# Patient Record
Sex: MALE | Race: WHITE | Employment: FULL TIME | URBAN - METROPOLITAN AREA
[De-identification: names, ages, dates, MRNs, and addresses within clinical notes are randomized per-mention and may not be internally consistent; named-entity substitution may affect disease eponyms.]

---

## 2017-03-09 ENCOUNTER — TELEPHONE (OUTPATIENT)
Dept: INTERNAL MEDICINE CLINIC | Age: 57
End: 2017-03-09

## 2017-03-09 NOTE — TELEPHONE ENCOUNTER
Pt called and wants to have labs done at [facility], [facility] general please call pt states he will  order on Monday.

## 2017-03-16 ENCOUNTER — OFFICE VISIT (OUTPATIENT)
Dept: INTERNAL MEDICINE CLINIC | Age: 57
End: 2017-03-16

## 2017-03-16 ENCOUNTER — HOSPITAL ENCOUNTER (OUTPATIENT)
Dept: LAB | Age: 57
Discharge: HOME | End: 2017-03-16
Payer: COMMERCIAL

## 2017-03-16 VITALS
HEART RATE: 51 BPM | TEMPERATURE: 97.4 F | RESPIRATION RATE: 18 BRPM | SYSTOLIC BLOOD PRESSURE: 144 MMHG | DIASTOLIC BLOOD PRESSURE: 70 MMHG | WEIGHT: 246 LBS | BODY MASS INDEX: 39.53 KG/M2 | OXYGEN SATURATION: 97 % | HEIGHT: 66 IN

## 2017-03-16 DIAGNOSIS — I10 UNSPECIFIED ESSENTIAL HYPERTENSION: ICD-10-CM

## 2017-03-16 DIAGNOSIS — R80.9 TYPE 2 DIABETES MELLITUS WITH MICROALBUMINURIA, WITH LONG-TERM CURRENT USE OF INSULIN (HCC): ICD-10-CM

## 2017-03-16 DIAGNOSIS — E78.5 HYPERLIPIDEMIA, UNSPECIFIED HYPERLIPIDEMIA TYPE: ICD-10-CM

## 2017-03-16 DIAGNOSIS — Z79.4 TYPE 2 DIABETES MELLITUS WITH MICROALBUMINURIA, WITH LONG-TERM CURRENT USE OF INSULIN (HCC): ICD-10-CM

## 2017-03-16 DIAGNOSIS — Z79.4 TYPE 2 DIABETES MELLITUS WITH MICROALBUMINURIA, WITH LONG-TERM CURRENT USE OF INSULIN (HCC): Primary | ICD-10-CM

## 2017-03-16 DIAGNOSIS — E11.29 TYPE 2 DIABETES MELLITUS WITH MICROALBUMINURIA, WITH LONG-TERM CURRENT USE OF INSULIN (HCC): ICD-10-CM

## 2017-03-16 DIAGNOSIS — L01.00 IMPETIGO: ICD-10-CM

## 2017-03-16 DIAGNOSIS — E03.9 HYPOTHYROIDISM, UNSPECIFIED TYPE: ICD-10-CM

## 2017-03-16 DIAGNOSIS — E11.29 TYPE 2 DIABETES MELLITUS WITH MICROALBUMINURIA, WITH LONG-TERM CURRENT USE OF INSULIN (HCC): Primary | ICD-10-CM

## 2017-03-16 DIAGNOSIS — R80.9 TYPE 2 DIABETES MELLITUS WITH MICROALBUMINURIA, WITH LONG-TERM CURRENT USE OF INSULIN (HCC): Primary | ICD-10-CM

## 2017-03-16 LAB
ALBUMIN SERPL BCP-MCNC: 3.6 G/DL (ref 3.4–5)
ALBUMIN/GLOB SERPL: 0.9 {RATIO} (ref 0.8–1.7)
ALP SERPL-CCNC: 80 U/L (ref 45–117)
ALT SERPL-CCNC: 45 U/L (ref 16–61)
ANION GAP BLD CALC-SCNC: 10 MMOL/L (ref 3–18)
AST SERPL W P-5'-P-CCNC: 30 U/L (ref 15–37)
BASOPHILS # BLD AUTO: 0.1 K/UL (ref 0–0.06)
BASOPHILS # BLD: 1 % (ref 0–2)
BILIRUB SERPL-MCNC: 0.7 MG/DL (ref 0.2–1)
BILIRUB UR QL STRIP: NEGATIVE
BUN SERPL-MCNC: 11 MG/DL (ref 7–18)
BUN/CREAT SERPL: 13 (ref 12–20)
CALCIUM SERPL-MCNC: 9 MG/DL (ref 8.5–10.1)
CHLORIDE SERPL-SCNC: 107 MMOL/L (ref 100–108)
CHOLEST SERPL-MCNC: 209 MG/DL
CO2 SERPL-SCNC: 25 MMOL/L (ref 21–32)
CREAT SERPL-MCNC: 0.84 MG/DL (ref 0.6–1.3)
CREAT SERPL-MCNC: 200 MG/DL
DIFFERENTIAL METHOD BLD: ABNORMAL
EOSINOPHIL # BLD: 0.2 K/UL (ref 0–0.4)
EOSINOPHIL NFR BLD: 2 % (ref 0–5)
ERYTHROCYTE [DISTWIDTH] IN BLOOD BY AUTOMATED COUNT: 13.1 % (ref 11.6–14.5)
EST. AVERAGE GLUCOSE BLD GHB EST-MCNC: 186 MG/DL
GLOBULIN SER CALC-MCNC: 4.1 G/DL (ref 2–4)
GLUCOSE SERPL-MCNC: 127 MG/DL (ref 74–99)
GLUCOSE UR-MCNC: NEGATIVE MG/DL
HBA1C MFR BLD: 8.1 % (ref 4.2–5.6)
HCT VFR BLD AUTO: 45.6 % (ref 36–48)
HDLC SERPL-MCNC: 47 MG/DL (ref 40–60)
HDLC SERPL: 4.4 {RATIO} (ref 0–5)
HGB BLD-MCNC: 15.6 G/DL (ref 13–16)
KETONES P FAST UR STRIP-MCNC: NEGATIVE MG/DL
LDLC SERPL CALC-MCNC: 129.2 MG/DL (ref 0–100)
LIPID PROFILE,FLP: ABNORMAL
LYMPHOCYTES # BLD AUTO: 18 % (ref 21–52)
LYMPHOCYTES # BLD: 1.5 K/UL (ref 0.9–3.6)
MCH RBC QN AUTO: 31 PG (ref 24–34)
MCHC RBC AUTO-ENTMCNC: 34.2 G/DL (ref 31–37)
MCV RBC AUTO: 90.5 FL (ref 74–97)
MICROALBUMIN UR TEST STR-MCNC: 150 MG/L
MICROALBUMIN/CREAT RATIO POC: NORMAL MG/G
MONOCYTES # BLD: 0.5 K/UL (ref 0.05–1.2)
MONOCYTES NFR BLD AUTO: 6 % (ref 3–10)
NEUTS SEG # BLD: 6.1 K/UL (ref 1.8–8)
NEUTS SEG NFR BLD AUTO: 73 % (ref 40–73)
PH UR STRIP: 5.5 [PH] (ref 4.6–8)
PLATELET # BLD AUTO: 215 K/UL (ref 135–420)
PMV BLD AUTO: 10.6 FL (ref 9.2–11.8)
POTASSIUM SERPL-SCNC: 3.7 MMOL/L (ref 3.5–5.5)
PROT SERPL-MCNC: 7.7 G/DL (ref 6.4–8.2)
PROT UR QL STRIP: NORMAL MG/DL
RBC # BLD AUTO: 5.04 M/UL (ref 4.7–5.5)
SODIUM SERPL-SCNC: 142 MMOL/L (ref 136–145)
SP GR UR STRIP: 1.03 (ref 1–1.03)
TRIGL SERPL-MCNC: 164 MG/DL (ref ?–150)
TSH SERPL DL<=0.05 MIU/L-ACNC: 1.97 UIU/ML (ref 0.36–3.74)
UA UROBILINOGEN AMB POC: NORMAL (ref 0.2–1)
URINALYSIS CLARITY POC: CLEAR
URINALYSIS COLOR POC: YELLOW
URINE BLOOD POC: NEGATIVE
URINE LEUKOCYTES POC: NEGATIVE
URINE NITRITES POC: NEGATIVE
VLDLC SERPL CALC-MCNC: 32.8 MG/DL
WBC # BLD AUTO: 8.3 K/UL (ref 4.6–13.2)

## 2017-03-16 PROCEDURE — 80053 COMPREHEN METABOLIC PANEL: CPT | Performed by: FAMILY MEDICINE

## 2017-03-16 PROCEDURE — 80061 LIPID PANEL: CPT | Performed by: FAMILY MEDICINE

## 2017-03-16 PROCEDURE — 83036 HEMOGLOBIN GLYCOSYLATED A1C: CPT | Performed by: FAMILY MEDICINE

## 2017-03-16 PROCEDURE — 84443 ASSAY THYROID STIM HORMONE: CPT | Performed by: FAMILY MEDICINE

## 2017-03-16 PROCEDURE — 85025 COMPLETE CBC W/AUTO DIFF WBC: CPT | Performed by: FAMILY MEDICINE

## 2017-03-16 RX ORDER — POTASSIUM CHLORIDE 750 MG/1
10 TABLET, EXTENDED RELEASE ORAL 2 TIMES DAILY
Qty: 60 TAB | Refills: 3 | Status: SHIPPED | OUTPATIENT
Start: 2017-03-16 | End: 2017-12-26 | Stop reason: SDUPTHER

## 2017-03-16 RX ORDER — INSULIN ASPART 100 [IU]/ML
INJECTION, SOLUTION INTRAVENOUS; SUBCUTANEOUS
Qty: 2 PEN | Refills: 3 | Status: SHIPPED | OUTPATIENT
Start: 2017-03-16 | End: 2017-03-16 | Stop reason: SDUPTHER

## 2017-03-16 RX ORDER — AMLODIPINE AND BENAZEPRIL HYDROCHLORIDE 5; 20 MG/1; MG/1
1 CAPSULE ORAL 2 TIMES DAILY
Qty: 60 CAP | Refills: 3 | Status: SHIPPED | OUTPATIENT
Start: 2017-03-16 | End: 2017-10-27 | Stop reason: SDUPTHER

## 2017-03-16 RX ORDER — MUPIROCIN CALCIUM 20 MG/G
CREAM TOPICAL 2 TIMES DAILY
Qty: 15 G | Refills: 0 | Status: SHIPPED | OUTPATIENT
Start: 2017-03-16

## 2017-03-16 RX ORDER — HYDROCHLOROTHIAZIDE 25 MG/1
25 TABLET ORAL DAILY
Qty: 30 TAB | Refills: 3 | Status: SHIPPED | OUTPATIENT
Start: 2017-03-16 | End: 2017-10-27 | Stop reason: SDUPTHER

## 2017-03-16 RX ORDER — ATORVASTATIN CALCIUM 40 MG/1
40 TABLET, FILM COATED ORAL DAILY
Qty: 30 TAB | Refills: 3 | Status: SHIPPED | OUTPATIENT
Start: 2017-03-16 | End: 2017-12-26 | Stop reason: SDUPTHER

## 2017-03-16 RX ORDER — LEVOTHYROXINE SODIUM 112 UG/1
112 TABLET ORAL
Qty: 30 TAB | Refills: 3 | Status: SHIPPED | OUTPATIENT
Start: 2017-03-16 | End: 2017-10-27 | Stop reason: SDUPTHER

## 2017-03-16 RX ORDER — INSULIN GLARGINE 100 [IU]/ML
86 INJECTION, SOLUTION SUBCUTANEOUS
Qty: 3 EACH | Refills: 3 | Status: SHIPPED | OUTPATIENT
Start: 2017-03-16 | End: 2017-12-26 | Stop reason: SDUPTHER

## 2017-03-16 RX ORDER — METFORMIN HYDROCHLORIDE 750 MG/1
750 TABLET, EXTENDED RELEASE ORAL 2 TIMES DAILY
Qty: 60 TAB | Refills: 3 | Status: SHIPPED | OUTPATIENT
Start: 2017-03-16 | End: 2017-12-26 | Stop reason: SDUPTHER

## 2017-03-16 NOTE — PATIENT INSTRUCTIONS
Impetigo: Care Instructions  Your Care Instructions  Impetigo (say \"xp-pkm-FZ-go\") is a skin infection caused by bacteria. It causes blisters that break and become oozing, yellow, crusty sores. Impetigo can be anywhere on the body. Scratching the sores may spread the infection to other parts of the body. You can also spread it to others through close contact or when you share towels, clothing, and other items. Prescription antibiotic ointment or pills can usually cure impetigo. (After a day of antibiotics, the infection should not spread.)  Follow-up care is a key part of your treatment and safety. Be sure to make and go to all appointments, and call your doctor if you are having problems. It's also a good idea to know your test results and keep a list of the medicines you take. How can you care for yourself at home? · Apply antibiotic ointment exactly as instructed. · If your doctor prescribed antibiotic pills, take them as directed. Do not stop using them just because you feel better. You need to take the full course of antibiotics. · Gently wash the sores with soap and water each day. If crusts form, your doctor may advise you to soften or remove the crusts. You can do this by soaking them in warm water and patting them dry. This can help the cream or ointment treat impetigo. · After you touch the area, wash your hands with soap and water. Or you can use an alcohol-based hand . · Don't share items such as towels, sheets, and clothing until the infection is gone. · Wash anything that may have touched the infected area. · Try to avoid scratching the area. When should you call for help? Call your doctor now or seek immediate medical care if:  · You have symptoms of a worse infection, such as:  ¨ Increased pain, swelling, warmth, or redness. ¨ Red streaks leading from the area. ¨ Pus draining from the area. ¨ A fever. · Impetigo gets worse or spreads to other areas.   Watch closely for changes in your health, and be sure to contact your doctor if:  · You do not get better as expected. Where can you learn more? Go to http://clair-margaret.info/. Enter B086 in the search box to learn more about \"Impetigo: Care Instructions. \"  Current as of: July 26, 2016  Content Version: 11.1  © 5860-0215 [employer]. Care instructions adapted under license by [employer] (which disclaims liability or warranty for this information). If you have questions about a medical condition or this instruction, always ask your healthcare professional. [place] [address] any warranty or liability for your use of this information.

## 2017-03-16 NOTE — PROGRESS NOTES
Subjective:   Patient is a 60y.o. year old male who presents for Hypertension (6 month follow up) and Diabetes  1. DM:  He's not doing well with his diabetes. For a while, he didn't have money to buy any insulin and was stretching out the insulin. He got more insulin last month, and he's taking 68 units daily to avoid hyperglycemia. When he eats, he has to take 20-30 units per meal of Novalog. His sugars are variable, sometimes getting into the 200s and sometimes in the 300s. He's trying to avoid fast food and keeping his carbs low. He's on metformin but usually only taking 1 at night. 2.  HTN:  He's on Lotrel and HCTZ. He takes this in the morning and took them this morning. 3.  Hyperlipidemia:  He stopped taking the Lipitor for a while because out of money but taking again. 4.  Hypothyroidism: On Synthroid daily 112 mcg. 3.  Grief reaction:  He's still grieving over the death of his dad who  when getting a CT scan last . He feels like he should have stopped them from doing the CT. Review of Systems   Constitutional: Negative. HENT:        Fluid in ear   Respiratory: Negative. Cardiovascular: Negative. Endo/Heme/Allergies: Negative. Current Outpatient Prescriptions on File Prior to Visit   Medication Sig Dispense Refill    glucose blood VI test strips (BLOOD GLUCOSE TEST) strip Use to check blood sugars as needed 200 Strip 3    BD INSULIN PEN NEEDLE UF MINI 31 gauge x 3/16\" ndle USE AS DIRECTED 100 Pen Needle 2    Lancets misc Use to check sugars 2 Package 11    [DISCONTINUED] potassium chloride (K-DUR, KLOR-CON) 10 mEq tablet Take 1 Tab by mouth two (2) times a day. 60 Tab 3     No current facility-administered medications on file prior to visit. Reviewed PmHx, RxHx, FmHx, SocHx, AllgHx and updated and dated in the chart.     Nurse notes were reviewed and are correct    Objective:     Vitals:    17 0834 17 0934   BP: 157/79 144/70   Pulse: (!) 51    Resp: 18    Temp: 97.4 °F (36.3 °C)    TempSrc: Oral    SpO2: 97%    Weight: 246 lb (111.6 kg)    Height: 5' 6\" (1.676 m)      Physical Exam   Constitutional: He is oriented to person, place, and time. He appears well-developed and well-nourished. No distress. HENT:   Head: Normocephalic and atraumatic. Nose: Nose normal.   Mouth/Throat: Oropharynx is clear and moist.   Bilateral cerumen, can't visualize TMs  Face:  1 cm diameter slightly crusted round sore on left chin   Eyes: EOM are normal. Pupils are equal, round, and reactive to light. Neck: Normal range of motion. Neck supple. Carotid bruit is not present. No tracheal deviation present. Cardiovascular: Normal rate, regular rhythm, normal heart sounds and intact distal pulses. Exam reveals no gallop and no friction rub. No murmur heard. Pulmonary/Chest: Effort normal and breath sounds normal. He has no wheezes. He has no rales. Abdominal: Soft. Bowel sounds are normal. He exhibits no distension. There is no tenderness. Musculoskeletal: He exhibits no edema or tenderness. Lymphadenopathy:     He has no cervical adenopathy. Neurological: He is alert and oriented to person, place, and time. Skin: Skin is warm and dry. Psychiatric: He has a normal mood and affect. His behavior is normal.   Nursing note and vitals reviewed. Urine:  2+ protein  Microalb/creat:      Assessment/ Plan:     Rodrigo was seen today for hypertension and diabetes. Diagnoses and all orders for this visit:    Type 2 diabetes mellitus with microalbuminuria, with long-term current use of insulin ([address]):  Getting A1c. Anticipate it will be high because he had to cut back on his insulin due to cost last month. He's interested in insulin pump, so sending to endocrinology.   -     AMB POC URINALYSIS DIP STICK AUTO W/O MICRO  -     AMB POC URINE, MICROALBUMIN, SEMIQUANTITATIVE  -     WY COLLECTION VENOUS BLOOD,VENIPUNCTURE  -     METABOLIC PANEL, COMPREHENSIVE; Future  -     HEMOGLOBIN A1C WITH EAG; Future  -     CBC WITH AUTOMATED DIFF; Future  -     insulin glargine (LANTUS SOLOSTAR) 100 unit/mL (3 mL) pen; 86 Units by SubCUTAneous route nightly. Indications: type 2 diabetes mellitus  -     insulin aspart (NOVOLOG FLEXPEN) 100 unit/mL inpn; AS PER SLIDING SCALE BEFORE MEALS  -     metFORMIN ER (GLUCOPHAGE XR) 750 mg tablet; Take 1 Tab by mouth two (2) times a day. -     REFERRAL TO ENDOCRINOLOGY    Hyperlipidemia, unspecified hyperlipidemia type:  He had stopped his Lipitor but is back on it. -     CO COLLECTION VENOUS BLOOD,VENIPUNCTURE  -     METABOLIC PANEL, COMPREHENSIVE; Future  -     LIPID PANEL; Future  -     CBC WITH AUTOMATED DIFF; Future  -     atorvastatin (LIPITOR) 40 mg tablet; Take 1 Tab by mouth daily. Hypothyroidism, unspecified type  -     CO COLLECTION VENOUS BLOOD,VENIPUNCTURE  -     METABOLIC PANEL, COMPREHENSIVE; Future  -     TSH 3RD GENERATION; Future  -     levothyroxine (SYNTHROID) 112 mcg tablet; Take 1 Tab by mouth Daily (before breakfast). Unspecified essential hypertension:  BP up slightly, but will not change medication since so close to 140. Recheck next time. Continue triple tx.  -     CO COLLECTION VENOUS BLOOD,VENIPUNCTURE  -     METABOLIC PANEL, COMPREHENSIVE; Future  -     potassium chloride (K-DUR, KLOR-CON) 10 mEq tablet; Take 1 Tab by mouth two (2) times a day. -     amLODIPine-benazepril (LOTREL) 5-20 mg per capsule; Take 1 Cap by mouth two (2) times a day. -     hydroCHLOROthiazide (HYDRODIURIL) 25 mg tablet; Take 1 Tab by mouth daily. Impetigo:  Presumptive. If the bactroban doesn't cure it, I told him I can send him to dermatology  -     mupirocin calcium (BACTROBAN) 2 % topical cream; Apply  to affected area two (2) times a day. I have discussed the diagnosis with the patient and the intended plan as seen in the above orders.   The patient verbalized understanding and agrees with the plan.    Follow-up Disposition:  Return in about 3 months (around 6/16/2017) for combo clinic.     Earlene Brock MD

## 2017-03-16 NOTE — MR AVS SNAPSHOT
Visit Information Date & Time Provider Department Dept. Phone Encounter #  
 3/16/2017  8:30 AM Elsy Gonzalez MD [facility] [id number] Follow-up Instructions Return in about 3 months (around 6/16/2017) for combo clinic. Upcoming Health Maintenance Date Due Pneumococcal 19-64 Medium Risk (1 of 1 - PPSV23) 3/20/1977 FOBT Q 1 YEAR AGE 50-75 3/20/2008 EYE EXAM RETINAL OR DILATED Q1 3/31/2016 FOOT EXAM Q1 4/17/2017 HEMOGLOBIN A1C Q6M 9/16/2017 LIPID PANEL Q1 9/22/2017 MICROALBUMIN Q1 3/16/2018 DTaP/Tdap/Td series (2 - Td) 9/22/2026 Allergies as of 3/16/2017  Review Complete On: 3/16/2017 By: Elsy Gonzalez MD  
  
 Severity Noted Reaction Type Reactions Bactrim [Sulfamethoprim Ds]  09/22/2016    Rash Penicillin G  07/01/2010    Rash Shellfish Containing Products  02/07/2012    Swelling Current Immunizations  Never Reviewed No immunizations on file. Not reviewed this visit You Were Diagnosed With   
  
 Codes Comments Type 2 diabetes mellitus with microalbuminuria, with long-term current use of insulin (HCC)    -  Primary ICD-10-CM: E11.29, R80.9, Z79.4 ICD-9-CM: 250.40, 791.0, V58.67 Hyperlipidemia, unspecified hyperlipidemia type     ICD-10-CM: E78.5 ICD-9-CM: 272.4 Hypothyroidism, unspecified type     ICD-10-CM: E03.9 ICD-9-CM: 244.9 Unspecified essential hypertension     ICD-10-CM: I10 
ICD-9-CM: 401.9 Essential hypertension with goal blood pressure less than 140/90     ICD-10-CM: I10 
ICD-9-CM: 401.9 Mixed hyperlipidemia     ICD-10-CM: E78.2 ICD-9-CM: 272.2 Acquired hypothyroidism     ICD-10-CM: E03.9 ICD-9-CM: 244.9 Uncontrolled type 2 diabetes mellitus with microalbuminuria, with long-term current use of insulin (HCC)     ICD-10-CM: E11.29, E11.65, R80.9, Z79.4 ICD-9-CM: 250.42, 791.0, V58.67 Impetigo     ICD-10-CM: L01.00 ICD-9-CM: 014 Vitals BP Pulse Temp Resp Height(growth percentile) Weight(growth percentile) 157/79 (BP 1 Location: Right arm, BP Patient Position: Sitting) (!) 51 97.4 °F (36.3 °C) (Oral) 18 5' 6\" (1.676 m) 246 lb (111.6 kg) SpO2 BMI Smoking Status 97% 39.71 kg/m2 Never Smoker Vitals History BMI and BSA Data Body Mass Index Body Surface Area  
 39.71 kg/m 2 2.28 m 2 Preferred Pharmacy Pharmacy Name Phone [facility] [address] [place] [address] ([facility]) [phone] Your Updated Medication List  
  
   
This list is accurate as of: 3/16/17  9:23 AM.  Always use your most recent med list. amLODIPine-benazepril 5-20 mg per capsule Commonly known as:  Mikle Chase Take 1 Cap by mouth two (2) times a day. atorvastatin 40 mg tablet Commonly known as:  LIPITOR Take 1 Tab by mouth daily. BD INSULIN PEN NEEDLE UF MINI 31 gauge x 3/16\" Ndle Generic drug:  Insulin Needles (Disposable) USE AS DIRECTED  
  
 glucose blood VI test strips strip Commonly known as:  blood glucose test  
Use to check blood sugars as needed  
  
 hydroCHLOROthiazide 25 mg tablet Commonly known as:  HYDRODIURIL Take 1 Tab by mouth daily. insulin aspart 100 unit/mL Inpn Commonly known as:  Nabil Sharma AS PER SLIDING SCALE BEFORE MEALS  
  
 insulin glargine 100 unit/mL (3 mL) pen Commonly known as:  LANTUS SOLOSTAR  
86 Units by SubCUTAneous route nightly. Indications: type 2 diabetes mellitus Lancets Misc Use to check sugars  
  
 levothyroxine 112 mcg tablet Commonly known as:  SYNTHROID Take 1 Tab by mouth Daily (before breakfast). metFORMIN  mg tablet Commonly known as:  GLUCOPHAGE XR Take 1 Tab by mouth two (2) times a day. mupirocin calcium 2 % topical cream  
Commonly known as:  [facility] Apply  to affected area two (2) times a day. potassium chloride 10 mEq tablet Commonly known as:  K-DUR, KLOR-CON Take 1 Tab by mouth two (2) times a day. Prescriptions Sent to Pharmacy Refills  
 potassium chloride (K-DUR, KLOR-CON) 10 mEq tablet 3 Sig: Take 1 Tab by mouth two (2) times a day. Class: Normal  
 Pharmacy: [facility] [address] Ph #: [phone] Route: Oral  
 insulin glargine (LANTUS SOLOSTAR) 100 unit/mL (3 mL) pen 3 Si Units by SubCUTAneous route nightly. Indications: type 2 diabetes mellitus Class: Normal  
 Pharmacy: [facility] [address] Ph #: [phone] Route: SubCUTAneous  
 insulin aspart (NOVOLOG FLEXPEN) 100 unit/mL inpn 3 Sig: AS PER SLIDING SCALE BEFORE MEALS Class: Normal  
 Pharmacy: [facility] [address] Ph #: [phone]  
 amLODIPine-benazepril (LOTREL) 5-20 mg per capsule 3 Sig: Take 1 Cap by mouth two (2) times a day. Class: Normal  
 Pharmacy: [facility] [address] Ph #: [phone] Route: Oral  
 atorvastatin (LIPITOR) 40 mg tablet 3 Sig: Take 1 Tab by mouth daily. Class: Normal  
 Pharmacy: [facility] [address] Ph #: [phone] Route: Oral  
 hydroCHLOROthiazide (HYDRODIURIL) 25 mg tablet 3 Sig: Take 1 Tab by mouth daily. Class: Normal  
 Pharmacy: [facility] [address] Ph #: [phone] Route: Oral  
 levothyroxine (SYNTHROID) 112 mcg tablet 3 Sig: Take 1 Tab by mouth Daily (before breakfast).   
 Class: Normal  
 Pharmacy: [facility] [address] [place] [address] ([facility]) Ph #: [phone] Route: Oral  
 metFORMIN ER (GLUCOPHAGE XR) 750 mg tablet 3 Sig: Take 1 Tab by mouth two (2) times a day. Class: Normal  
 Pharmacy: [facility] [address] Ph #: [phone] Route: Oral  
 mupirocin calcium (BACTROBAN) 2 % topical cream 0 Sig: Apply  to affected area two (2) times a day. Class: Normal  
 Pharmacy: [facility] [address] Ph #: [phone] Route: Topical  
  
We Performed the Following AMB POC URINALYSIS DIP STICK AUTO W/O MICRO [52932 CPT(R)] AMB POC URINE, MICROALBUMIN, SEMIQUANTITATIVE [46472 CPT(R)] ND COLLECTION VENOUS BLOOD,VENIPUNCTURE [id number] CPT(R)] REFERRAL TO ENDOCRINOLOGY [JEN63 Custom] Comments:  
 Please evaluate patient for insulin requiring type 2 DM with microalbuminuria. On high doses of insulin. Wants to consider insulin pump. Follow-up Instructions Return in about 3 months (around 6/16/2017) for combo clinic. To-Do List   
 03/16/2017 Lab:  CBC WITH AUTOMATED DIFF   
  
 03/16/2017 Lab:  HEMOGLOBIN A1C WITH EAG   
  
 03/16/2017 Lab:  LIPID PANEL   
  
 03/16/2017 Lab:  METABOLIC PANEL, COMPREHENSIVE   
  
 03/16/2017 Lab:  TSH 3RD GENERATION Referral Information Referral ID Referred By Referred To  
  
 [id number] Amol Cerrato Not Available Visits Status Start Date End Date 1 New Request 3/16/17 3/16/18 If your referral has a status of pending review or denied, additional information will be sent to support the outcome of this decision. Patient Instructions Impetigo: Care Instructions Your Care Instructions Impetigo (say \"gs-jiv-FF-go\") is a skin infection caused by bacteria. It causes blisters that break and become oozing, yellow, crusty sores. Impetigo can be anywhere on the body. Scratching the sores may spread the infection to other parts of the body. You can also spread it to others through close contact or when you share towels, clothing, and other items. Prescription antibiotic ointment or pills can usually cure impetigo. (After a day of antibiotics, the infection should not spread.) Follow-up care is a key part of your treatment and safety. Be sure to make and go to all appointments, and call your doctor if you are having problems. It's also a good idea to know your test results and keep a list of the medicines you take. How can you care for yourself at home? · Apply antibiotic ointment exactly as instructed. · If your doctor prescribed antibiotic pills, take them as directed. Do not stop using them just because you feel better. You need to take the full course of antibiotics. · Gently wash the sores with soap and water each day. If crusts form, your doctor may advise you to soften or remove the crusts. You can do this by soaking them in warm water and patting them dry. This can help the cream or ointment treat impetigo. · After you touch the area, wash your hands with soap and water. Or you can use an alcohol-based hand . · Don't share items such as towels, sheets, and clothing until the infection is gone. · Wash anything that may have touched the infected area. · Try to avoid scratching the area. When should you call for help? Call your doctor now or seek immediate medical care if: 
· You have symptoms of a worse infection, such as: 
¨ Increased pain, swelling, warmth, or redness. ¨ Red streaks leading from the area. ¨ Pus draining from the area. ¨ A fever. · Impetigo gets worse or spreads to other areas. Watch closely for changes in your health, and be sure to contact your doctor if: 
· You do not get better as expected. Where can you learn more? Go to http://clair-margaret.info/. Enter C060 in the search box to learn more about \"Impetigo: Care Instructions. \" Current as of: July 26, 2016 Content Version: 11.1 © 9909-2855 [employer]. Care instructions adapted under license by [employer] (which disclaims liability or warranty for this information). If you have questions about a medical condition or this instruction, always ask your healthcare professional. [address] any warranty or liability for your use of this information. Introducing [facility]! Farhana Kemp introduces [employer] patient portal. Now you can access parts of your medical record, email your doctor's office, and request medication refills online. 1. In your internet browser, go to [email or web address] 2. Click on the First Time User? Click Here link in the Sign In box. You will see the New Member Sign Up page. 3. Enter your [employer] Access Code exactly as it appears below. You will not need to use this code after youve completed the sign-up process. If you do not sign up before the expiration date, you must request a new code. · [employer] Access Code: [email or web address] Expires: 6/14/2017  9:23 AM 
 
4. Enter the last four digits of your Social Security Number (xxxx) and Date of Birth (mm/dd/yyyy) as indicated and click Submit. You will be taken to the next sign-up page. 5. Create a [employer] ID. This will be your [employer] login ID and cannot be changed, so think of one that is secure and easy to remember. 6. Create a [employer] password. You can change your password at any time. 7. Enter your Password Reset Question and Answer. This can be used at a later time if you forget your password. 8. Enter your e-mail address. You will receive e-mail notification when new information is available in [address] 9. Click Sign Up. You can now view and download portions of your medical record. 10. Click the Download Summary menu link to download a portable copy of your medical information. If you have questions, please visit the Frequently Asked Questions section of the [employer] website. Remember, [employer] is NOT to be used for urgent needs. For medical emergencies, dial 911. Now available from your iPhone and Android! Please provide this summary of care documentation to your next provider. Your primary care clinician is listed as Rubi Keep. If you have any questions after today's visit, please call [phone].

## 2017-03-16 NOTE — LETTER
3/20/2017 3:33 PM 
 
Mr. Arlyn Burciaga [address] [facility] [address] Dear Arlyn Rodarte: 
 
Please find your most recent results below. Resulted Orders METABOLIC PANEL, COMPREHENSIVE Result Value Ref Range Sodium 142 136 - 145 mmol/L Potassium 3.7 3.5 - 5.5 mmol/L Chloride 107 100 - 108 mmol/L  
 CO2 25 21 - 32 mmol/L Anion gap 10 3.0 - 18 mmol/L Glucose 127 (H) 74 - 99 mg/dL BUN 11 7.0 - 18 MG/DL Creatinine 0.84 0.6 - 1.3 MG/DL  
 BUN/Creatinine ratio 13 12 - 20 GFR est AA >60 >60 ml/min/1.73m2 GFR est non-AA >60 >60 ml/min/1.73m2 Comment:  
   (NOTE) Estimated GFR is calculated using the Modification of Diet in Renal  
Disease (MDRD) Study equation, reported for both  Americans [facility]) and non- Americans (GFRNA), and normalized to 1.73m2  
body surface area. The physician must decide which value applies to  
the patient. The MDRD study equation should only be used in  
individuals age 25 or older. It has not been validated for the  
following: pregnant women, patients with serious comorbid conditions,  
or on certain medications, or persons with extremes of body size,  
muscle mass, or nutritional status. Calcium 9.0 8.5 - 10.1 MG/DL Bilirubin, total 0.7 0.2 - 1.0 MG/DL  
 ALT (SGPT) 45 16 - 61 U/L  
 AST (SGOT) 30 15 - 37 U/L Alk. phosphatase 80 45 - 117 U/L Protein, total 7.7 6.4 - 8.2 g/dL Albumin 3.6 3.4 - 5.0 g/dL Globulin 4.1 (H) 2.0 - 4.0 g/dL A-G Ratio 0.9 0.8 - 1.7 LIPID PANEL Result Value Ref Range LIPID PROFILE Cholesterol, total 209 (H) <200 MG/DL Triglyceride 164 (H) <150 MG/DL Comment:  
   The drugs N-acetylcysteine (NAC) and 
Metamiszole have been found to cause falsely 
low results in this chemical assay. Please 
be sure to submit blood samples obtained BEFORE administration of either of these 
drugs to assure correct results. HDL Cholesterol 47 40 - 60 MG/DL  
 LDL, calculated 129.2 (H) 0 - 100 MG/DL VLDL, calculated 32.8 MG/DL  
 CHOL/HDL Ratio 4.4 0 - 5.0 HEMOGLOBIN A1C WITH EAG Result Value Ref Range Hemoglobin A1c 8.1 (H) 4.2 - 5.6 % Comment:  
   (NOTE) HbA1C Interpretive Ranges <5.7              Normal 
5.7 - 6.4         Consider Prediabetes >6.5              Consider Diabetes Est. average glucose 186 mg/dL Comment:  
   (NOTE) The eAG should be interpreted with patient characteristics in mind  
since ethnicity, interindividual differences, red cell lifespan,  
variation in rates of glycation, etc. may affect the validity of the  
calculation. CBC WITH AUTOMATED DIFF Result Value Ref Range WBC 8.3 4.6 - 13.2 K/uL  
 RBC 5.04 4.70 - 5.50 M/uL  
 HGB 15.6 13.0 - 16.0 g/dL HCT 45.6 36.0 - 48.0 % MCV 90.5 74.0 - 97.0 FL  
 MCH 31.0 24.0 - 34.0 PG  
 MCHC 34.2 31.0 - 37.0 g/dL  
 RDW 13.1 11.6 - 14.5 % PLATELET 304 366 - 102 K/uL MPV 10.6 9.2 - 11.8 FL  
 NEUTROPHILS 73 40 - 73 % LYMPHOCYTES 18 (L) 21 - 52 % MONOCYTES 6 3 - 10 % EOSINOPHILS 2 0 - 5 % BASOPHILS 1 0 - 2 %  
 ABS. NEUTROPHILS 6.1 1.8 - 8.0 K/UL  
 ABS. LYMPHOCYTES 1.5 0.9 - 3.6 K/UL  
 ABS. MONOCYTES 0.5 0.05 - 1.2 K/UL  
 ABS. EOSINOPHILS 0.2 0.0 - 0.4 K/UL  
 ABS. BASOPHILS 0.1 (H) 0.0 - 0.06 K/UL  
 DF AUTOMATED    
TSH 3RD GENERATION Result Value Ref Range TSH 1.97 0.36 - 3.74 uIU/mL RECOMMENDATIONS: 
Your A1c is up from 7.6 to 8.1, probably due to the decrease in insulin so hopefully you are able to take the prescribed dose now. Your TSH is normal so continue current dose of Synthroid. Your cholesterol is high, are you taking your Lipitor daily? Have you heard from endocrinology yet? Please call the office and let us know. Please call me if you have any questions: [phone] Sincerely, 
 
Corey Plascencia MD

## 2017-03-17 RX ORDER — INSULIN ASPART 100 [IU]/ML
INJECTION, SOLUTION INTRAVENOUS; SUBCUTANEOUS
Qty: 2 PEN | Refills: 3 | Status: SHIPPED | OUTPATIENT
Start: 2017-03-17 | End: 2017-12-26 | Stop reason: SDUPTHER

## 2017-03-20 ENCOUNTER — TELEPHONE (OUTPATIENT)
Dept: INTERNAL MEDICINE CLINIC | Age: 57
End: 2017-03-20

## 2017-03-20 NOTE — PROGRESS NOTES
Please let Mr. Jazmin Hernandez know that his A1c was 8.1, which was up from 7.6 last time. That's explainable by the fact that he had decreased his insulin recently due to costs issues. Hopefully he can stick to his prescribed doses now. Ask him if he's heard from endocrinology yet.

## 2017-03-20 NOTE — PROGRESS NOTES
Also let him know his TSH was normal, so he's on the right dose of Synthroid. His cholesterol has increased.   Make sure he's taking his daily Lipitor

## 2017-03-20 NOTE — TELEPHONE ENCOUNTER
MARIOI - mailed result letter when unable to leave message but patient saw our number on caller ID and returned my call. He is back on regular dose of insulin, he had not been taking his Lipitor every day but he will do so now. He had not heard from endo office yet so I gave him the contact info and he will give them a call tomorrow.

## 2017-07-31 RX ORDER — BLOOD SUGAR DIAGNOSTIC
STRIP MISCELLANEOUS
Qty: 600 STRIP | Refills: 3 | Status: SHIPPED | OUTPATIENT
Start: 2017-07-31 | End: 2018-08-01 | Stop reason: SDUPTHER

## 2017-08-02 ENCOUNTER — TELEPHONE (OUTPATIENT)
Dept: INTERNAL MEDICINE CLINIC | Age: 57
End: 2017-08-02

## 2017-08-02 NOTE — TELEPHONE ENCOUNTER
Mr Garrie Napoleon called to say his RT ear is still bothering him and he also has fluid in it. He was seen 7/21/17 at [facility] for this. He would like a referral to ENT as soon as possible. He said he has new insurance and that it requires authorizations for referrals. Advised patient that we need a copy of the front and back of his card before we can do anything.

## 2017-08-03 ENCOUNTER — OFFICE VISIT (OUTPATIENT)
Dept: INTERNAL MEDICINE CLINIC | Age: 57
End: 2017-08-03

## 2017-08-03 VITALS
BODY MASS INDEX: 39.53 KG/M2 | RESPIRATION RATE: 18 BRPM | WEIGHT: 246 LBS | TEMPERATURE: 98.9 F | DIASTOLIC BLOOD PRESSURE: 74 MMHG | HEIGHT: 66 IN | SYSTOLIC BLOOD PRESSURE: 128 MMHG | HEART RATE: 73 BPM

## 2017-08-03 DIAGNOSIS — H66.001 ACUTE SUPPURATIVE OTITIS MEDIA OF RIGHT EAR WITHOUT SPONTANEOUS RUPTURE OF TYMPANIC MEMBRANE, RECURRENCE NOT SPECIFIED: ICD-10-CM

## 2017-08-03 DIAGNOSIS — H91.91 DECREASED HEARING, RIGHT: ICD-10-CM

## 2017-08-03 DIAGNOSIS — H92.01 RIGHT EAR PAIN: ICD-10-CM

## 2017-08-03 DIAGNOSIS — H61.21 IMPACTED CERUMEN OF RIGHT EAR: Primary | ICD-10-CM

## 2017-08-03 NOTE — PROGRESS NOTES
Chief Complaint   Patient presents with    Ear Pain     Right ear worse    Ear Fullness     1. Have you been to the ER, urgent care clinic since your last visit? Hospitalized since your last visit? Yes [facility]: [facility]    2. Have you seen or consulted any other health care providers outside of the [address] [place] [address] since your last visit? Include any pap smears or colon screening.  No

## 2017-08-03 NOTE — PROGRESS NOTES
Subjective:   Patient is a 59y.o. year old male who presents for Ear Pain (Right ear worse) and Ear Fullness  1. Right ear fullness: Had ear pain in the right ear. Worse with pulling on the ear. Also has decreased hearing in the right ear. He can hear better in the morning but worse later in the day. He's taking Mucinex, Sudafed, and benadryl. Went to [facility] and was given Doxycycline. Tried a nose spray and saline as well. No nasal congestion. Review of Systems   HENT: Positive for ear pain and hearing loss. Negative for ear discharge. Respiratory: Negative. Current Outpatient Prescriptions on File Prior to Visit   Medication Sig Dispense Refill    ACCU-CHEK GE PLUS TEST STRP strip CHECK BLOOD SUGARS 6 TIMES A  Strip 3    insulin aspart (NOVOLOG FLEXPEN) 100 unit/mL inpn AS PER SLIDING SCALE BEFORE MEALS 2 Pen 3    potassium chloride (K-DUR, KLOR-CON) 10 mEq tablet Take 1 Tab by mouth two (2) times a day. 60 Tab 3    insulin glargine (LANTUS SOLOSTAR) 100 unit/mL (3 mL) pen 86 Units by SubCUTAneous route nightly. Indications: type 2 diabetes mellitus 3 Each 3    amLODIPine-benazepril (LOTREL) 5-20 mg per capsule Take 1 Cap by mouth two (2) times a day. 60 Cap 3    atorvastatin (LIPITOR) 40 mg tablet Take 1 Tab by mouth daily. 30 Tab 3    hydroCHLOROthiazide (HYDRODIURIL) 25 mg tablet Take 1 Tab by mouth daily. 30 Tab 3    levothyroxine (SYNTHROID) 112 mcg tablet Take 1 Tab by mouth Daily (before breakfast). 30 Tab 3    metFORMIN ER (GLUCOPHAGE XR) 750 mg tablet Take 1 Tab by mouth two (2) times a day. 60 Tab 3    mupirocin calcium (BACTROBAN) 2 % topical cream Apply  to affected area two (2) times a day. 15 g 0    BD INSULIN PEN NEEDLE UF MINI 31 gauge x 3/16\" ndle USE AS DIRECTED 100 Pen Needle 2    Lancets misc Use to check sugars 2 Package 11     No current facility-administered medications on file prior to visit.         Reviewed PmHx, RxHx, FmHx, SocHx, AllgHx and updated and dated in the chart. Nurse notes were reviewed and are correct    Objective:     Vitals:    08/03/17 1620   BP: 128/74   Pulse: 73   Resp: 18   Temp: 98.9 °F (37.2 °C)   TempSrc: Oral   Weight: 246 lb (111.6 kg)   Height: 5' 6\" (1.676 m)     Physical Exam   Constitutional: He is oriented to person, place, and time. He appears well-developed and well-nourished. No distress. HENT:   Head: Normocephalic and atraumatic. Left Ear: External ear normal.   Nose: Nose normal.   Mouth/Throat: Oropharynx is clear and moist.   Right canal impacted with cerumen. Cannot visualize the TM   Eyes: EOM are normal. Pupils are equal, round, and reactive to light. Neck: Normal range of motion. Neck supple. Carotid bruit is not present. No tracheal deviation present. Cardiovascular: Normal rate, regular rhythm, normal heart sounds and intact distal pulses. Exam reveals no gallop and no friction rub. No murmur heard. Pulmonary/Chest: Effort normal and breath sounds normal. He has no wheezes. He has no rales. Abdominal: Soft. Bowel sounds are normal. He exhibits no distension. There is no tenderness. Musculoskeletal: He exhibits no edema or tenderness. Lymphadenopathy:     He has no cervical adenopathy. Neurological: He is alert and oriented to person, place, and time. Skin: Skin is warm and dry. Psychiatric: He has a normal mood and affect. His behavior is normal.   Nursing note and vitals reviewed. Procedure:  Alberto irrigated the right canal and was able to remove a large amount of cerumen. Afterwards the patient could hear much better. The right TM was injected and it looked like there was cloudy fluid behind the TM. Assessment/ Plan:     Diagnoses and all orders for this visit:    1. Impacted cerumen of right ear:  Removed cerumen with good results, no complications  -     REMOVAL IMPACTED CERUMEN IRRIGATION/LVG UNILAT    2.  Right ear pain  -     REMOVAL IMPACTED CERUMEN IRRIGATION/LVG UNILAT    3. Decreased hearing, right  -     REMOVAL IMPACTED CERUMEN IRRIGATION/LVG UNILAT    4. Acute suppurative otitis media of right ear without spontaneous rupture of tympanic membrane, recurrence not specified:  He's still on course of doxycycline given by [facility].  His ear pain is much better, so told him to finish the course and RTC if further symptoms. No need to see ENT at this time. I have discussed the diagnosis with the patient and the intended plan as seen in the above orders. The patient verbalized understanding and agrees with the plan.     Follow-up Disposition: Not on File    Neto Salas MD

## 2017-08-04 NOTE — PATIENT INSTRUCTIONS
Ear Infection (Otitis Media): Care Instructions  Your Care Instructions    An ear infection may start with a cold and affect the middle ear (otitis media). It can hurt a lot. Most ear infections clear up on their own in a couple of days. Most often you will not need antibiotics. This is because many ear infections are caused by a virus. Antibiotics don't work against a virus. Regular doses of pain medicines are the best way to reduce your fever and help you feel better. Follow-up care is a key part of your treatment and safety. Be sure to make and go to all appointments, and call your doctor if you are having problems. It's also a good idea to know your test results and keep a list of the medicines you take. How can you care for yourself at home? · Take pain medicines exactly as directed. ¨ If the doctor gave you a prescription medicine for pain, take it as prescribed. ¨ If you are not taking a prescription pain medicine, take an over-the-counter medicine, such as acetaminophen (Tylenol), ibuprofen (Advil, Motrin), or naproxen (Aleve). Read and follow all instructions on the label. ¨ Do not take two or more pain medicines at the same time unless the doctor told you to. Many pain medicines have acetaminophen, which is Tylenol. Too much acetaminophen (Tylenol) can be harmful. · Plan to take a full dose of pain reliever before bedtime. Getting enough sleep will help you get better. · Try a warm, moist washcloth on the ear. It may help relieve pain. · If your doctor prescribed antibiotics, take them as directed. Do not stop taking them just because you feel better. You need to take the full course of antibiotics. When should you call for help? Call your doctor now or seek immediate medical care if:  · You have new or increasing ear pain. · You have new or increasing pus or blood draining from your ear. · You have a fever with a stiff neck or a severe headache.   Watch closely for changes in your health, and be sure to contact your doctor if:  · You have new or worse symptoms. · You are not getting better after taking an antibiotic for 2 days. Where can you learn more? Go to http://clair-margaret.info/. Enter S617 in the search box to learn more about \"Ear Infection (Otitis Media): Care Instructions. \"  Current as of: May 4, 2017  Content Version: 11.3  © 2839-0862 [employer]. Care instructions adapted under license by [employer] (which disclaims liability or warranty for this information). If you have questions about a medical condition or this instruction, always ask your healthcare professional. [address] any warranty or liability for your use of this information.

## 2017-10-26 DIAGNOSIS — I10 HYPERTENSION, UNSPECIFIED TYPE: ICD-10-CM

## 2017-10-26 DIAGNOSIS — E03.9 HYPOTHYROIDISM, UNSPECIFIED TYPE: ICD-10-CM

## 2017-10-26 RX ORDER — PEN NEEDLE, DIABETIC 31 GX5/16"
NEEDLE, DISPOSABLE MISCELLANEOUS
Qty: 100 PEN NEEDLE | Refills: 2 | Status: SHIPPED | OUTPATIENT
Start: 2017-10-26 | End: 2017-12-26 | Stop reason: SDUPTHER

## 2017-10-27 RX ORDER — HYDROCHLOROTHIAZIDE 25 MG/1
25 TABLET ORAL DAILY
Qty: 30 TAB | Refills: 0 | Status: SHIPPED | OUTPATIENT
Start: 2017-10-27 | End: 2017-12-26 | Stop reason: SDUPTHER

## 2017-10-27 RX ORDER — LEVOTHYROXINE SODIUM 112 UG/1
112 TABLET ORAL
Qty: 30 TAB | Refills: 0 | Status: SHIPPED | OUTPATIENT
Start: 2017-10-27 | End: 2017-12-26 | Stop reason: SDUPTHER

## 2017-10-27 RX ORDER — AMLODIPINE AND BENAZEPRIL HYDROCHLORIDE 5; 20 MG/1; MG/1
1 CAPSULE ORAL 2 TIMES DAILY
Qty: 60 CAP | Refills: 0 | Status: SHIPPED | OUTPATIENT
Start: 2017-10-27 | End: 2017-12-26 | Stop reason: SDUPTHER

## 2017-12-26 DIAGNOSIS — E78.5 HYPERLIPIDEMIA, UNSPECIFIED HYPERLIPIDEMIA TYPE: ICD-10-CM

## 2017-12-26 DIAGNOSIS — Z12.5 SCREENING FOR PROSTATE CANCER: Primary | ICD-10-CM

## 2017-12-26 DIAGNOSIS — I10 ESSENTIAL HYPERTENSION: ICD-10-CM

## 2017-12-26 DIAGNOSIS — E03.9 HYPOTHYROIDISM, UNSPECIFIED TYPE: ICD-10-CM

## 2017-12-26 DIAGNOSIS — Z79.4 TYPE 2 DIABETES MELLITUS WITH MICROALBUMINURIA, WITH LONG-TERM CURRENT USE OF INSULIN (HCC): ICD-10-CM

## 2017-12-26 DIAGNOSIS — R80.9 TYPE 2 DIABETES MELLITUS WITH MICROALBUMINURIA, WITH LONG-TERM CURRENT USE OF INSULIN (HCC): ICD-10-CM

## 2017-12-26 DIAGNOSIS — E11.29 TYPE 2 DIABETES MELLITUS WITH MICROALBUMINURIA, WITH LONG-TERM CURRENT USE OF INSULIN (HCC): ICD-10-CM

## 2017-12-26 RX ORDER — INSULIN GLARGINE 100 [IU]/ML
INJECTION, SOLUTION SUBCUTANEOUS
Qty: 11 PEN | Refills: 3 | Status: SHIPPED | OUTPATIENT
Start: 2017-12-26 | End: 2018-01-29 | Stop reason: SDUPTHER

## 2017-12-26 RX ORDER — ATORVASTATIN CALCIUM 40 MG/1
40 TABLET, FILM COATED ORAL DAILY
Qty: 30 TAB | Refills: 3 | Status: SHIPPED | OUTPATIENT
Start: 2017-12-26 | End: 2018-01-29 | Stop reason: SDUPTHER

## 2017-12-26 RX ORDER — INSULIN ASPART 100 [IU]/ML
INJECTION, SOLUTION INTRAVENOUS; SUBCUTANEOUS
Qty: 2 PEN | Refills: 3 | Status: SHIPPED | OUTPATIENT
Start: 2017-12-26 | End: 2018-08-01 | Stop reason: SDUPTHER

## 2017-12-26 RX ORDER — PEN NEEDLE, DIABETIC 31 GX3/16"
NEEDLE, DISPOSABLE MISCELLANEOUS
Qty: 100 PEN NEEDLE | Refills: 2 | Status: SHIPPED | OUTPATIENT
Start: 2017-12-26 | End: 2018-11-14 | Stop reason: SDUPTHER

## 2017-12-26 RX ORDER — AMLODIPINE AND BENAZEPRIL HYDROCHLORIDE 5; 20 MG/1; MG/1
1 CAPSULE ORAL 2 TIMES DAILY
Qty: 60 CAP | Refills: 0 | Status: SHIPPED | OUTPATIENT
Start: 2017-12-26 | End: 2018-01-29 | Stop reason: SDUPTHER

## 2017-12-26 RX ORDER — POTASSIUM CHLORIDE 750 MG/1
10 TABLET, EXTENDED RELEASE ORAL 2 TIMES DAILY
Qty: 60 TAB | Refills: 3 | Status: SHIPPED | OUTPATIENT
Start: 2017-12-26 | End: 2018-11-06 | Stop reason: SDUPTHER

## 2017-12-26 RX ORDER — HYDROCHLOROTHIAZIDE 25 MG/1
25 TABLET ORAL DAILY
Qty: 30 TAB | Refills: 0 | Status: SHIPPED | OUTPATIENT
Start: 2017-12-26 | End: 2018-01-29 | Stop reason: SDUPTHER

## 2017-12-26 RX ORDER — METFORMIN HYDROCHLORIDE 750 MG/1
750 TABLET, EXTENDED RELEASE ORAL 2 TIMES DAILY
Qty: 60 TAB | Refills: 3 | Status: SHIPPED | OUTPATIENT
Start: 2017-12-26 | End: 2018-01-29 | Stop reason: SDUPTHER

## 2017-12-26 RX ORDER — LEVOTHYROXINE SODIUM 112 UG/1
112 TABLET ORAL
Qty: 30 TAB | Refills: 0 | Status: SHIPPED | OUTPATIENT
Start: 2017-12-26 | End: 2018-01-29 | Stop reason: SDUPTHER

## 2017-12-29 LAB
ALBUMIN SERPL-MCNC: 3.6 G/DL (ref 3.5–5.5)
ALBUMIN/GLOB SERPL: 1 {RATIO} (ref 1.2–2.2)
ALP SERPL-CCNC: 74 IU/L (ref 39–117)
ALT SERPL-CCNC: 46 IU/L (ref 0–44)
AST SERPL-CCNC: 30 IU/L (ref 0–40)
BASOPHILS # BLD AUTO: 0.1 X10E3/UL (ref 0–0.2)
BASOPHILS NFR BLD AUTO: 1 %
BILIRUB SERPL-MCNC: 0.3 MG/DL (ref 0–1.2)
BUN SERPL-MCNC: 15 MG/DL (ref 6–24)
BUN/CREAT SERPL: 21 (ref 9–20)
CALCIUM SERPL-MCNC: 9 MG/DL (ref 8.7–10.2)
CHLORIDE SERPL-SCNC: 100 MMOL/L (ref 96–106)
CHOLEST SERPL-MCNC: 301 MG/DL (ref 100–199)
CO2 SERPL-SCNC: 22 MMOL/L (ref 18–29)
CREAT SERPL-MCNC: 0.73 MG/DL (ref 0.76–1.27)
EOSINOPHIL # BLD AUTO: 0.2 X10E3/UL (ref 0–0.4)
EOSINOPHIL NFR BLD AUTO: 3 %
ERYTHROCYTE [DISTWIDTH] IN BLOOD BY AUTOMATED COUNT: 14.5 % (ref 12.3–15.4)
EST. AVERAGE GLUCOSE BLD GHB EST-MCNC: 200 MG/DL
GLOBULIN SER CALC-MCNC: 3.5 G/DL (ref 1.5–4.5)
GLUCOSE SERPL-MCNC: 198 MG/DL (ref 65–99)
HBA1C MFR BLD: 8.6 % (ref 4.8–5.6)
HCT VFR BLD AUTO: 42.7 % (ref 37.5–51)
HDLC SERPL-MCNC: 26 MG/DL
HGB BLD-MCNC: 14.7 G/DL (ref 13–17.7)
IMM GRANULOCYTES # BLD: 0 X10E3/UL (ref 0–0.1)
IMM GRANULOCYTES NFR BLD: 0 %
LDLC SERPL CALC-MCNC: ABNORMAL MG/DL (ref 0–99)
LYMPHOCYTES # BLD AUTO: 1.6 X10E3/UL (ref 0.7–3.1)
LYMPHOCYTES NFR BLD AUTO: 21 %
MCH RBC QN AUTO: 30.8 PG (ref 26.6–33)
MCHC RBC AUTO-ENTMCNC: 34.4 G/DL (ref 31.5–35.7)
MCV RBC AUTO: 90 FL (ref 79–97)
MONOCYTES # BLD AUTO: 0.4 X10E3/UL (ref 0.1–0.9)
MONOCYTES NFR BLD AUTO: 5 %
MORPHOLOGY BLD-IMP: NORMAL
NEUTROPHILS # BLD AUTO: 5.4 X10E3/UL (ref 1.4–7)
NEUTROPHILS NFR BLD AUTO: 70 %
PLATELET # BLD AUTO: 203 X10E3/UL (ref 150–379)
POTASSIUM SERPL-SCNC: 3.9 MMOL/L (ref 3.5–5.2)
PROT SERPL-MCNC: 7.1 G/DL (ref 6–8.5)
PSA SERPL-MCNC: 0.2 NG/ML (ref 0–4)
RBC # BLD AUTO: 4.77 X10E6/UL (ref 4.14–5.8)
SODIUM SERPL-SCNC: 139 MMOL/L (ref 134–144)
TRIGL SERPL-MCNC: 1413 MG/DL (ref 0–149)
TSH SERPL DL<=0.005 MIU/L-ACNC: 4.88 UIU/ML (ref 0.45–4.5)
VLDLC SERPL CALC-MCNC: ABNORMAL MG/DL (ref 5–40)
WBC # BLD AUTO: 7.7 X10E3/UL (ref 3.4–10.8)

## 2017-12-29 NOTE — PROGRESS NOTES
I reviewed his labs and we can talk about them next week but he had a critically high level that I need to let him know:  His triglycerides were 1413. They've never been very high before so this is unusual.  Was he not fasting when he came for labs?

## 2018-01-29 ENCOUNTER — OFFICE VISIT (OUTPATIENT)
Dept: INTERNAL MEDICINE CLINIC | Age: 58
End: 2018-01-29

## 2018-01-29 VITALS
DIASTOLIC BLOOD PRESSURE: 70 MMHG | SYSTOLIC BLOOD PRESSURE: 158 MMHG | RESPIRATION RATE: 18 BRPM | WEIGHT: 263 LBS | OXYGEN SATURATION: 97 % | BODY MASS INDEX: 42.27 KG/M2 | HEIGHT: 66 IN | TEMPERATURE: 98 F | HEART RATE: 61 BPM

## 2018-01-29 DIAGNOSIS — E78.5 HYPERLIPIDEMIA, UNSPECIFIED HYPERLIPIDEMIA TYPE: ICD-10-CM

## 2018-01-29 DIAGNOSIS — E11.29 TYPE 2 DIABETES MELLITUS WITH MICROALBUMINURIA, WITH LONG-TERM CURRENT USE OF INSULIN (HCC): Primary | ICD-10-CM

## 2018-01-29 DIAGNOSIS — H73.92 TM (TYMPANIC MEMBRANE DISORDER), LEFT: ICD-10-CM

## 2018-01-29 DIAGNOSIS — E03.9 HYPOTHYROIDISM, UNSPECIFIED TYPE: ICD-10-CM

## 2018-01-29 DIAGNOSIS — H91.93 DECREASED HEARING OF BOTH EARS: ICD-10-CM

## 2018-01-29 DIAGNOSIS — R80.9 TYPE 2 DIABETES MELLITUS WITH MICROALBUMINURIA, WITH LONG-TERM CURRENT USE OF INSULIN (HCC): Primary | ICD-10-CM

## 2018-01-29 DIAGNOSIS — Z79.4 TYPE 2 DIABETES MELLITUS WITH MICROALBUMINURIA, WITH LONG-TERM CURRENT USE OF INSULIN (HCC): Primary | ICD-10-CM

## 2018-01-29 DIAGNOSIS — I10 ESSENTIAL HYPERTENSION: ICD-10-CM

## 2018-01-29 PROBLEM — E66.01 OBESITY, MORBID (HCC): Status: ACTIVE | Noted: 2018-01-29

## 2018-01-29 RX ORDER — INSULIN GLARGINE 100 [IU]/ML
INJECTION, SOLUTION SUBCUTANEOUS
Qty: 10 PEN | Refills: 5 | Status: SHIPPED | OUTPATIENT
Start: 2018-01-29 | End: 2018-08-01 | Stop reason: SDUPTHER

## 2018-01-29 RX ORDER — HYDROCHLOROTHIAZIDE 25 MG/1
25 TABLET ORAL DAILY
Qty: 30 TAB | Refills: 5 | Status: SHIPPED | OUTPATIENT
Start: 2018-01-29 | End: 2018-08-01 | Stop reason: SDUPTHER

## 2018-01-29 RX ORDER — AMLODIPINE AND BENAZEPRIL HYDROCHLORIDE 5; 20 MG/1; MG/1
2 CAPSULE ORAL DAILY
Qty: 60 CAP | Refills: 5 | Status: SHIPPED | OUTPATIENT
Start: 2018-01-29 | End: 2018-08-01 | Stop reason: SDUPTHER

## 2018-01-29 RX ORDER — METFORMIN HYDROCHLORIDE 750 MG/1
750 TABLET, EXTENDED RELEASE ORAL 2 TIMES DAILY
Qty: 60 TAB | Refills: 5 | Status: SHIPPED | OUTPATIENT
Start: 2018-01-29 | End: 2018-11-02 | Stop reason: SDUPTHER

## 2018-01-29 RX ORDER — ATORVASTATIN CALCIUM 40 MG/1
40 TABLET, FILM COATED ORAL DAILY
Qty: 30 TAB | Refills: 5 | Status: SHIPPED | OUTPATIENT
Start: 2018-01-29 | End: 2018-11-02 | Stop reason: SDUPTHER

## 2018-01-29 RX ORDER — LEVOTHYROXINE SODIUM 112 UG/1
112 TABLET ORAL
Qty: 30 TAB | Refills: 5 | Status: SHIPPED | OUTPATIENT
Start: 2018-01-29 | End: 2018-11-02 | Stop reason: SDUPTHER

## 2018-01-29 NOTE — PATIENT INSTRUCTIONS

## 2018-01-29 NOTE — PROGRESS NOTES
Subjective:   Patient is a 59y.o. year old male who presents for Diabetes; Cholesterol Problem; Hypertension; and Thyroid Problem  1. DM:  Last seen in Aug.  Had blood work at the end of Dec.  A1c was up to 8.6. He's on Lantus 68 units at night and 40 in the afternoon and Novolog TID with meals, up to 30-60 units at a time. Also on metformin. He's really sensitive to carbs. Checking sugars. Fasting in the mornins-180s. This morning was 80 but did great with low carbs yesterday. If eats carbs, his sugars often go into 300s. 2.  Hyperlipidemia:  Lipids were very high at last blood draw:   and Triglyc. 1400s. On Lipitor 40  and says he was taking when had blood drawn. 3.  HTN:  On Lotrel and HCTZ. BP elevated today. 4.  Hypothyroidism: On Synthroid 112 mcg. Last TSH: 4.8. Review of Systems   Constitutional: Negative. Cardiovascular: Negative. Current Outpatient Prescriptions on File Prior to Visit   Medication Sig Dispense Refill    Insulin Needles, Disposable, (BD INSULIN PEN NEEDLE UF MINI) 31 gauge x 3/16\" ndle AS DIRECTED 100 Pen Needle 2    insulin aspart (NOVOLOG FLEXPEN) 100 unit/mL inpn AS PER SLIDING SCALE BEFORE MEALS 2 Pen 3    potassium chloride (KLOR-CON) 10 mEq tablet Take 1 Tab by mouth two (2) times a day. 60 Tab 3    ACCU-CHEK GE PLUS TEST STRP strip CHECK BLOOD SUGARS 6 TIMES A  Strip 3    mupirocin calcium (BACTROBAN) 2 % topical cream Apply  to affected area two (2) times a day. 15 g 0    Lancets misc Use to check sugars 2 Package 11     No current facility-administered medications on file prior to visit. Reviewed PmHx, RxHx, FmHx, SocHx, AllgHx and updated and dated in the chart.     Nurse notes were reviewed and are correct    Objective:     Vitals:    18 1638 18 1639   BP: 164/70 158/70   Pulse: 62 61   Resp: 18    Temp: 98 °F (36.7 °C)    TempSrc: Oral    SpO2: 97%    Weight: 263 lb (119.3 kg)    Height: 5' 6\" (1.676 m)      Physical Exam   Constitutional: He is oriented to person, place, and time. He appears well-developed and well-nourished. No distress. HENT:   Head: Normocephalic and atraumatic. Right Ear: External ear normal.   Nose: Nose normal.   Mouth/Throat: Oropharynx is clear and moist.   Left TM:  Some wax in canal but TM visible. TM looks distorted. Eyes: EOM are normal. Pupils are equal, round, and reactive to light. Neck: Normal range of motion. Neck supple. Carotid bruit is not present. No tracheal deviation present. Cardiovascular: Normal rate, regular rhythm, normal heart sounds and intact distal pulses. Exam reveals no gallop and no friction rub. No murmur heard. Pulmonary/Chest: Effort normal and breath sounds normal. He has no wheezes. He has no rales. Abdominal: Soft. Bowel sounds are normal. He exhibits no distension. There is no tenderness. Musculoskeletal: He exhibits no edema or tenderness. Lymphadenopathy:     He has no cervical adenopathy. Neurological: He is alert and oriented to person, place, and time. Skin: Skin is warm and dry. Psychiatric: He has a normal mood and affect. His behavior is normal.   Nursing note and vitals reviewed. Assessment/ Plan:     Diagnoses and all orders for this visit:    1. Type 2 diabetes mellitus with microalbuminuria, with long-term current use of insulin [facility]):  Had long discussion about getting sugars down because A1c is 8.6 and he's gained weight. I told him we would normally increase Lantus to bring A1c down but that could increase weight further. He's going to first try heavily reducing his carbs and sweets and if that is not sufficient to bring sugars down, he will further increase his Lantus. -     metFORMIN ER (GLUCOPHAGE XR) 750 mg tablet; Take 1 Tab by mouth two (2) times a day.   -     insulin glargine (LANTUS,BASAGLAR) 100 unit/mL (3 mL) inpn; 40 units SQ qAM, 70 units SQ qPM  Indications: type 2 diabetes mellitus    2. Essential hypertension:  BP high today. Stressed taking his meds and working on diet. On max doses of current meds, so will continue these and if BP still high at next visit, will have to add another med. -     amLODIPine-benazepril (LOTREL) 5-20 mg per capsule; Take 2 Caps by mouth daily. -     hydroCHLOROthiazide (HYDRODIURIL) 25 mg tablet; Take 1 Tab by mouth daily. 3. Hypothyroidism, unspecified type: Continue current dose  -     levothyroxine (SYNTHROID) 112 mcg tablet; Take 1 Tab by mouth Daily (before breakfast). 4. Hyperlipidemia, unspecified hyperlipidemia type:  Cholesterol and triglycerides very high. He admits maybe he was out of the Lipitor when drawn. So will continue Lipitor and recheck at next visit. -     atorvastatin (LIPITOR) 40 mg tablet; Take 1 Tab by mouth daily. 5. Decreased hearing of both ears:  Has been to [facility] before, but thinks his doctor retired. Generating a new referral.  -     REFERRAL TO ENT-OTOLARYNGOLOGY    6. Tm (tympanic membrane disorder), left  -     REFERRAL TO ENT-OTOLARYNGOLOGY       I have discussed the diagnosis with the patient and the intended plan as seen in the above orders. The patient verbalized understanding and agrees with the plan. Follow-up Disposition:  Return in about 2 months (around 3/29/2018) for combo clinic, blood work, microalbumin.     Meagan Damon MD

## 2018-01-29 NOTE — PROGRESS NOTES
Chief Complaint   Patient presents with    Diabetes    Cholesterol Problem    Hypertension    Thyroid Problem   1. Have you been to the ER, urgent care clinic since your last visit? Hospitalized since your last visit? No    2. Have you seen or consulted any other health care providers outside of the [address] [place] [address] since your last visit? Include any pap smears or colon screening.  No

## 2018-02-27 ENCOUNTER — TELEPHONE (OUTPATIENT)
Dept: INTERNAL MEDICINE CLINIC | Age: 58
End: 2018-02-27

## 2018-02-27 NOTE — TELEPHONE ENCOUNTER
Patient needs an updated referral for endocrinology.   H never went on previous referral.  Patient is going to [facility]

## 2018-03-26 DIAGNOSIS — I10 ESSENTIAL HYPERTENSION: ICD-10-CM

## 2018-03-26 DIAGNOSIS — R79.89 ELEVATED TSH: ICD-10-CM

## 2018-03-26 DIAGNOSIS — E78.2 MIXED HYPERLIPIDEMIA: ICD-10-CM

## 2018-05-14 ENCOUNTER — OFFICE VISIT (OUTPATIENT)
Dept: INTERNAL MEDICINE CLINIC | Age: 58
End: 2018-05-14

## 2018-05-14 VITALS
OXYGEN SATURATION: 95 % | TEMPERATURE: 98.7 F | HEIGHT: 66 IN | DIASTOLIC BLOOD PRESSURE: 68 MMHG | WEIGHT: 260 LBS | HEART RATE: 55 BPM | BODY MASS INDEX: 41.78 KG/M2 | SYSTOLIC BLOOD PRESSURE: 122 MMHG | RESPIRATION RATE: 16 BRPM

## 2018-05-14 DIAGNOSIS — J02.9 SORE THROAT: Primary | ICD-10-CM

## 2018-05-14 DIAGNOSIS — J06.9 VIRAL UPPER RESPIRATORY TRACT INFECTION: ICD-10-CM

## 2018-05-14 LAB
S PYO AG THROAT QL: NEGATIVE
VALID INTERNAL CONTROL?: YES

## 2018-05-14 RX ORDER — AZITHROMYCIN 250 MG/1
TABLET, FILM COATED ORAL
Qty: 6 TAB | Refills: 0 | Status: ON HOLD | OUTPATIENT
Start: 2018-05-14 | End: 2021-07-20

## 2018-05-14 NOTE — MR AVS SNAPSHOT
[address] [facility] [address] [place] [address] 
[phone] Patient: Tao Shaffer MRN: [id number] Visit Information Date & Time Provider Department Dept. Phone Encounter #  
 5/14/2018  3:00 PM Aida Salinas PA-C [facility] [phone] Follow-up Instructions Return if symptoms worsen or fail to improve. Upcoming Health Maintenance Date Due  
 EYE EXAM RETINAL OR DILATED Q1 3/31/2016 FOOT EXAM Q1 4/17/2017 ZOSTER VACCINE AGE 60> 1/20/2018 MICROALBUMIN Q1 3/16/2018 Influenza Age 5 to Adult 8/1/2018 HEMOGLOBIN A1C Q6M 11/14/2018 LIPID PANEL Q1 5/14/2019 COLONOSCOPY 6/10/2020 DTaP/Tdap/Td series (2 - Td) 9/22/2026 Allergies as of 5/14/2018  Review Complete On: 5/14/2018 By: Aravind Monterroso LPN Severity Noted Reaction Type Reactions Bactrim [Sulfamethoprim Ds]  09/22/2016    Rash Penicillin G  07/01/2010    Rash Shellfish Containing Products  02/07/2012    Swelling Current Immunizations  Never Reviewed No immunizations on file. Not reviewed this visit You Were Diagnosed With   
  
 Codes Comments Sore throat    -  Primary ICD-10-CM: J02.9 ICD-9-CM: 379 Viral upper respiratory tract infection     ICD-10-CM: J06.9 ICD-9-CM: 465.9 Vitals BP Pulse Temp Resp Height(growth percentile) Weight(growth percentile) 122/68 (BP 1 Location: Right arm, BP Patient Position: Sitting) (!) 55 98.7 °F (37.1 °C) (Oral) 16 5' 6\" (1.676 m) 260 lb (117.9 kg) SpO2 BMI Smoking Status 95% 41.97 kg/m2 Never Smoker BMI and BSA Data Body Mass Index Body Surface Area 41.97 kg/m 2 2.34 m 2 Preferred Pharmacy Pharmacy Name Phone [facility] [phone] [address] ([facility]) [phone] Your Updated Medication List  
  
   
 This list is accurate as of 5/14/18 11:59 PM.  Always use your most recent med list.  
  
  
  
  
 ACCU-CHEK GE PLUS TEST STRP strip Generic drug:  glucose blood VI test strips CHECK BLOOD SUGARS 6 TIMES A DAY  
  
 amLODIPine-benazepril 5-20 mg per capsule Commonly known as:  Yoan Sill Take 2 Caps by mouth daily. atorvastatin 40 mg tablet Commonly known as:  LIPITOR Take 1 Tab by mouth daily. azithromycin 250 mg tablet Commonly known as:  Shena Jaquan Take two tablets today then one tablet daily  
  
 hydroCHLOROthiazide 25 mg tablet Commonly known as:  HYDRODIURIL Take 1 Tab by mouth daily. insulin aspart U-100 100 unit/mL Inpn Commonly known as:  NovoLOG Flexpen U-100 Insulin AS PER SLIDING SCALE BEFORE MEALS  
  
 insulin glargine 100 unit/mL (3 mL) Inpn Commonly known as:  LANTUS,BASAGLAR  
40 units SQ qAM, 70 units SQ qPM  Indications: type 2 diabetes mellitus Insulin Needles (Disposable) 31 gauge x 3/16\" Ndle Commonly known as:  BD ULTRA-FINE MINI PEN NEEDLE  
AS DIRECTED Lancets Misc Use to check sugars  
  
 levothyroxine 112 mcg tablet Commonly known as:  SYNTHROID Take 1 Tab by mouth Daily (before breakfast). metFORMIN  mg tablet Commonly known as:  GLUCOPHAGE XR Take 1 Tab by mouth two (2) times a day. mupirocin calcium 2 % topical cream  
Commonly known as:  [facility] Apply  to affected area two (2) times a day. potassium chloride 10 mEq tablet Commonly known as:  KLOR-CON Take 1 Tab by mouth two (2) times a day. Prescriptions Sent to Pharmacy Refills  
 azithromycin (ZITHROMAX Z-LEVON) 250 mg tablet 0 Sig: Take two tablets today then one tablet daily Class: Normal  
 Pharmacy: [facility] [address] Ph #: [phone] We Performed the Following AMB POC RAPID STREP A [13920 CPT(R)] Follow-up Instructions Return if symptoms worsen or fail to improve. Patient Instructions Sore Throat: Care Instructions Your Care Instructions Infection by bacteria or a virus causes most sore throats. Cigarette smoke, dry air, air pollution, allergies, and yelling can also cause a sore throat. Sore throats can be painful and annoying. Fortunately, most sore throats go away on their own. If you have a bacterial infection, your doctor may prescribe antibiotics. Follow-up care is a key part of your treatment and safety. Be sure to make and go to all appointments, and call your doctor if you are having problems. It's also a good idea to know your test results and keep a list of the medicines you take. How can you care for yourself at home? · If your doctor prescribed antibiotics, take them as directed. Do not stop taking them just because you feel better. You need to take the full course of antibiotics. · Gargle with warm salt water once an hour to help reduce swelling and relieve discomfort. Use 1 teaspoon of salt mixed in 1 cup of warm water. · Take an over-the-counter pain medicine, such as acetaminophen (Tylenol), ibuprofen (Advil, Motrin), or naproxen (Aleve). Read and follow all instructions on the label. · Be careful when taking over-the-counter cold or flu medicines and Tylenol at the same time. Many of these medicines have acetaminophen, which is Tylenol. Read the labels to make sure that you are not taking more than the recommended dose. Too much acetaminophen (Tylenol) can be harmful. · Drink plenty of fluids. Fluids may help soothe an irritated throat. Hot fluids, such as tea or soup, may help decrease throat pain. · Use over-the-counter throat lozenges to soothe pain. Regular cough drops or hard candy may also help. These should not be given to young children because of the risk of choking. · Do not smoke or allow others to smoke around you.  If you need help quitting, talk to your doctor about stop-smoking programs and medicines. These can increase your chances of quitting for good. · Use a vaporizer or humidifier to add moisture to your bedroom. Follow the directions for cleaning the machine. When should you call for help? Call your doctor now or seek immediate medical care if: 
? · You have new or worse trouble swallowing. ? · Your sore throat gets much worse on one side. ? Watch closely for changes in your health, and be sure to contact your doctor if you do not get better as expected. Where can you learn more? Go to http://clair-margaret.info/. Enter 062 441 80 19 in the search box to learn more about \"Sore Throat: Care Instructions. \" Current as of: May 12, 2017 Content Version: 11.4 © 8473-3181 [employer]. Care instructions adapted under license by [employer] (which disclaims liability or warranty for this information). If you have questions about a medical condition or this instruction, always ask your healthcare professional. [place] [address] any warranty or liability for your use of this information. Introducing [facility]! [facility] introduces [employer] patient portal. Now you can access parts of your medical record, email your doctor's office, and request medication refills online. 1. In your internet browser, go to [email or web address] 2. Click on the First Time User? Click Here link in the Sign In box. You will see the New Member Sign Up page. 3. Enter your [employer] Access Code exactly as it appears below. You will not need to use this code after youve completed the sign-up process. If you do not sign up before the expiration date, you must request a new code. · [employer] Access Code: [email or web address] Expires: 8/14/2018 10:37 AM 
 
4.  Enter the last four digits of your Social Security Number (xxxx) and Date of Birth (mm/dd/yyyy) as indicated and click Submit. You will be taken to the next sign-up page. 5. Create a [employer] Exchange ID. This will be your [employer] login ID and cannot be changed, so think of one that is secure and easy to remember. 6. Create a [employer] password. You can change your password at any time. 7. Enter your Password Reset Question and Answer. This can be used at a later time if you forget your password. 8. Enter your e-mail address. You will receive e-mail notification when new information is available in [address] 9. Click Sign Up. You can now view and download portions of your medical record. 10. Click the Download Summary menu link to download a portable copy of your medical information. If you have questions, please visit the Frequently Asked Questions section of the [employer] website. Remember, [employer] Exchange is NOT to be used for urgent needs. For medical emergencies, dial 911. Now available from your iPhone and Android! Please provide this summary of care documentation to your next provider. Your primary care clinician is listed as Arlette Escobar. If you have any questions after today's visit, please call [phone].

## 2018-05-14 NOTE — PROGRESS NOTES
Chief Complaint   Patient presents with    Sore Throat     Pt in c/o sore throat for 2 days. Tahir has strep.  Ear Fullness     C/O decreased hearing. Can plug nose and pop ears and hearing is better for a minute. No OTC meds tried. Seen by ENT 1 month ago and given some solution to put in ears, no relief. 1. Have you been to the ER, urgent care clinic since your last visit? Hospitalized since your last visit? No    2. Have you seen or consulted any other health care providers outside of the [address] [place] [address] since your last visit? Include any pap smears or colon screening.  Yes When: ENT last month    PHQ over the last two weeks 5/14/2018   Little interest or pleasure in doing things Not at all   Feeling down, depressed or hopeless Not at all   Total Score PHQ 2 0

## 2018-05-15 LAB
ALBUMIN SERPL-MCNC: 4.1 G/DL (ref 3.6–4.8)
ALBUMIN/GLOB SERPL: 1.1 {RATIO} (ref 1.2–2.2)
ALP SERPL-CCNC: 71 IU/L (ref 39–117)
ALT SERPL-CCNC: 59 IU/L (ref 0–44)
AST SERPL-CCNC: 44 IU/L (ref 0–40)
BASOPHILS # BLD AUTO: 0 X10E3/UL (ref 0–0.2)
BASOPHILS NFR BLD AUTO: 0 %
BILIRUB SERPL-MCNC: 0.5 MG/DL (ref 0–1.2)
BUN SERPL-MCNC: 13 MG/DL (ref 8–27)
BUN/CREAT SERPL: 15 (ref 10–24)
CALCIUM SERPL-MCNC: 9.6 MG/DL (ref 8.6–10.2)
CHLORIDE SERPL-SCNC: 104 MMOL/L (ref 96–106)
CHOLEST SERPL-MCNC: 188 MG/DL (ref 100–199)
CO2 SERPL-SCNC: 22 MMOL/L (ref 18–29)
CREAT SERPL-MCNC: 0.86 MG/DL (ref 0.76–1.27)
EOSINOPHIL # BLD AUTO: 0.2 X10E3/UL (ref 0–0.4)
EOSINOPHIL NFR BLD AUTO: 3 %
ERYTHROCYTE [DISTWIDTH] IN BLOOD BY AUTOMATED COUNT: 13.9 % (ref 12.3–15.4)
EST. AVERAGE GLUCOSE BLD GHB EST-MCNC: 197 MG/DL
GFR SERPLBLD CREATININE-BSD FMLA CKD-EPI: 109 ML/MIN/1.73
GFR SERPLBLD CREATININE-BSD FMLA CKD-EPI: 94 ML/MIN/1.73
GLOBULIN SER CALC-MCNC: 3.6 G/DL (ref 1.5–4.5)
GLUCOSE SERPL-MCNC: 85 MG/DL (ref 65–99)
HBA1C MFR BLD: 8.5 % (ref 4.8–5.6)
HCT VFR BLD AUTO: 43.9 % (ref 37.5–51)
HDLC SERPL-MCNC: 40 MG/DL
HGB BLD-MCNC: 14.9 G/DL (ref 13–17.7)
IMM GRANULOCYTES # BLD: 0 X10E3/UL (ref 0–0.1)
IMM GRANULOCYTES NFR BLD: 0 %
LDLC SERPL CALC-MCNC: 109 MG/DL (ref 0–99)
LYMPHOCYTES # BLD AUTO: 2 X10E3/UL (ref 0.7–3.1)
LYMPHOCYTES NFR BLD AUTO: 21 %
MCH RBC QN AUTO: 30.3 PG (ref 26.6–33)
MCHC RBC AUTO-ENTMCNC: 33.9 G/DL (ref 31.5–35.7)
MCV RBC AUTO: 89 FL (ref 79–97)
MONOCYTES # BLD AUTO: 0.5 X10E3/UL (ref 0.1–0.9)
MONOCYTES NFR BLD AUTO: 6 %
NEUTROPHILS # BLD AUTO: 6.5 X10E3/UL (ref 1.4–7)
NEUTROPHILS NFR BLD AUTO: 70 %
PLATELET # BLD AUTO: 218 X10E3/UL (ref 150–379)
POTASSIUM SERPL-SCNC: 3.9 MMOL/L (ref 3.5–5.2)
PROT SERPL-MCNC: 7.7 G/DL (ref 6–8.5)
RBC # BLD AUTO: 4.92 X10E6/UL (ref 4.14–5.8)
SODIUM SERPL-SCNC: 144 MMOL/L (ref 134–144)
TRIGL SERPL-MCNC: 194 MG/DL (ref 0–149)
TSH SERPL DL<=0.005 MIU/L-ACNC: 2.27 UIU/ML (ref 0.45–4.5)
VLDLC SERPL CALC-MCNC: 39 MG/DL (ref 5–40)
WBC # BLD AUTO: 9.3 X10E3/UL (ref 3.4–10.8)

## 2018-05-16 NOTE — PATIENT INSTRUCTIONS
Sore Throat: Care Instructions  Your Care Instructions    Infection by bacteria or a virus causes most sore throats. Cigarette smoke, dry air, air pollution, allergies, and yelling can also cause a sore throat. Sore throats can be painful and annoying. Fortunately, most sore throats go away on their own. If you have a bacterial infection, your doctor may prescribe antibiotics. Follow-up care is a key part of your treatment and safety. Be sure to make and go to all appointments, and call your doctor if you are having problems. It's also a good idea to know your test results and keep a list of the medicines you take. How can you care for yourself at home? · If your doctor prescribed antibiotics, take them as directed. Do not stop taking them just because you feel better. You need to take the full course of antibiotics. · Gargle with warm salt water once an hour to help reduce swelling and relieve discomfort. Use 1 teaspoon of salt mixed in 1 cup of warm water. · Take an over-the-counter pain medicine, such as acetaminophen (Tylenol), ibuprofen (Advil, Motrin), or naproxen (Aleve). Read and follow all instructions on the label. · Be careful when taking over-the-counter cold or flu medicines and Tylenol at the same time. Many of these medicines have acetaminophen, which is Tylenol. Read the labels to make sure that you are not taking more than the recommended dose. Too much acetaminophen (Tylenol) can be harmful. · Drink plenty of fluids. Fluids may help soothe an irritated throat. Hot fluids, such as tea or soup, may help decrease throat pain. · Use over-the-counter throat lozenges to soothe pain. Regular cough drops or hard candy may also help. These should not be given to young children because of the risk of choking. · Do not smoke or allow others to smoke around you. If you need help quitting, talk to your doctor about stop-smoking programs and medicines.  These can increase your chances of quitting for good. · Use a vaporizer or humidifier to add moisture to your bedroom. Follow the directions for cleaning the machine. When should you call for help? Call your doctor now or seek immediate medical care if:  ? · You have new or worse trouble swallowing. ? · Your sore throat gets much worse on one side. ? Watch closely for changes in your health, and be sure to contact your doctor if you do not get better as expected. Where can you learn more? Go to http://clair-margaret.info/. Enter [phone] [id number] [phone] in the search box to learn more about \"Sore Throat: Care Instructions. \"  Current as of: May 12, 2017  Content Version: 11.4  © 6482-0667 Healthwise, Incorporated. Care instructions adapted under license by [employer] (which disclaims liability or warranty for this information). If you have questions about a medical condition or this instruction, always ask your healthcare professional. [address] any warranty or liability for your use of this information.

## 2018-05-16 NOTE — PROGRESS NOTES
HISTORY OF PRESENT ILLNESS  Michelle Almanza is a 59 y.o. male. HPI   Pt c/o ST x 2 days assoc with some congestion and a dry cough. Pt has a niece with strep and is concerned about that. Denies fever, chills, HA, rash, N/V/D, otalgia, dizziness, SOB, wheezing, palpitations,  and CP. Allergies   Allergen Reactions    Bactrim [Sulfamethoprim Ds] Rash    Penicillin G Rash    Shellfish Containing Products Swelling     Current Outpatient Prescriptions   Medication Sig    azithromycin (ZITHROMAX Z-LEVON) 250 mg tablet Take two tablets today then one tablet daily    amLODIPine-benazepril (LOTREL) 5-20 mg per capsule Take 2 Caps by mouth daily.  hydroCHLOROthiazide (HYDRODIURIL) 25 mg tablet Take 1 Tab by mouth daily.  levothyroxine (SYNTHROID) 112 mcg tablet Take 1 Tab by mouth Daily (before breakfast).  atorvastatin (LIPITOR) 40 mg tablet Take 1 Tab by mouth daily.  metFORMIN ER (GLUCOPHAGE XR) 750 mg tablet Take 1 Tab by mouth two (2) times a day.  insulin glargine (LANTUS,BASAGLAR) 100 unit/mL (3 mL) inpn 40 units SQ qAM, 70 units SQ qPM  Indications: type 2 diabetes mellitus    Insulin Needles, Disposable, (BD INSULIN PEN NEEDLE UF MINI) 31 gauge x 3/16\" ndle AS DIRECTED    insulin aspart (NOVOLOG FLEXPEN) 100 unit/mL inpn AS PER SLIDING SCALE BEFORE MEALS    potassium chloride (KLOR-CON) 10 mEq tablet Take 1 Tab by mouth two (2) times a day.  mupirocin calcium (BACTROBAN) 2 % topical cream Apply  to affected area two (2) times a day.  Lancets misc Use to check sugars    ACCU-CHEK GE PLUS TEST STRP strip CHECK BLOOD SUGARS 6 TIMES A DAY     No current facility-administered medications for this visit. Review of Systems   Constitutional: Negative. Negative for chills, fever and malaise/fatigue. HENT: Positive for congestion and sore throat. Negative for ear pain and tinnitus. Eyes: Negative. Negative for blurred vision, double vision and photophobia.    Respiratory: Positive for cough. Negative for sputum production, shortness of breath and wheezing. Cardiovascular: Negative. Negative for chest pain, palpitations and leg swelling. Gastrointestinal: Negative. Negative for abdominal pain, heartburn, nausea and vomiting. Genitourinary: Negative. Negative for dysuria, frequency, hematuria and urgency. Musculoskeletal: Negative for myalgias. Skin: Negative. Negative for itching and rash. Neurological: Negative for dizziness and headaches. Visit Vitals    /68 (BP 1 Location: Right arm, BP Patient Position: Sitting)    Pulse (!) 55    Temp 98.7 °F (37.1 °C) (Oral)    Resp 16    Ht 5' 6\" (1.676 m)    Wt 260 lb (117.9 kg)    SpO2 95%    BMI 41.97 kg/m2       Physical Exam   Constitutional: He is oriented to person, place, and time. No distress. HENT:   Head: Normocephalic and atraumatic. Right Ear: Tympanic membrane, external ear and ear canal normal.   Left Ear: Tympanic membrane, external ear and ear canal normal.   Nose: Mucosal edema present. No rhinorrhea. Right sinus exhibits no maxillary sinus tenderness and no frontal sinus tenderness. Left sinus exhibits no maxillary sinus tenderness and no frontal sinus tenderness. Mouth/Throat: Uvula is midline and mucous membranes are normal. Posterior oropharyngeal erythema present. No oropharyngeal exudate, posterior oropharyngeal edema or tonsillar abscesses. Cardiovascular: Normal rate, regular rhythm and normal heart sounds. Pulmonary/Chest: Effort normal and breath sounds normal.   Neurological: He is alert and oriented to person, place, and time. Skin: Skin is warm and dry. He is not diaphoretic. ASSESSMENT and PLAN    ICD-10-CM ICD-9-CM    1. Sore throat J02.9 462 AMB POC RAPID STREP A      azithromycin (ZITHROMAX Z-LEVON) 250 mg tablet   2. Viral upper respiratory tract infection J06.9 465.9      Follow-up Disposition:  Return if symptoms worsen or fail to improve.      Pt expressed understanding of visit summary and plans for any follow ups or referrals as well as any medications prescribed.

## 2018-07-05 ENCOUNTER — TELEPHONE (OUTPATIENT)
Dept: INTERNAL MEDICINE CLINIC | Age: 58
End: 2018-07-05

## 2018-07-06 NOTE — TELEPHONE ENCOUNTER
Pt called on call and is out of insulin. He states the pharmacy sent a request 7/2/18 and got no response.  Approved 1 month of humalog on sliding scale and pt to f/u with Dr Venessa Soriano for additional refills and OV

## 2018-08-01 ENCOUNTER — OFFICE VISIT (OUTPATIENT)
Dept: INTERNAL MEDICINE CLINIC | Age: 58
End: 2018-08-01

## 2018-08-01 VITALS
TEMPERATURE: 98.5 F | BODY MASS INDEX: 42.11 KG/M2 | HEART RATE: 56 BPM | RESPIRATION RATE: 18 BRPM | OXYGEN SATURATION: 95 % | WEIGHT: 262 LBS | DIASTOLIC BLOOD PRESSURE: 71 MMHG | HEIGHT: 66 IN | SYSTOLIC BLOOD PRESSURE: 123 MMHG

## 2018-08-01 DIAGNOSIS — R80.9 TYPE 2 DIABETES MELLITUS WITH MICROALBUMINURIA, WITH LONG-TERM CURRENT USE OF INSULIN (HCC): ICD-10-CM

## 2018-08-01 DIAGNOSIS — H69.93 EUSTACHIAN TUBE DISORDER, BILATERAL: ICD-10-CM

## 2018-08-01 DIAGNOSIS — H60.332 ACUTE SWIMMER'S EAR OF LEFT SIDE: Primary | ICD-10-CM

## 2018-08-01 DIAGNOSIS — E11.29 TYPE 2 DIABETES MELLITUS WITH MICROALBUMINURIA, WITH LONG-TERM CURRENT USE OF INSULIN (HCC): ICD-10-CM

## 2018-08-01 DIAGNOSIS — Z79.4 TYPE 2 DIABETES MELLITUS WITH MICROALBUMINURIA, WITH LONG-TERM CURRENT USE OF INSULIN (HCC): ICD-10-CM

## 2018-08-01 DIAGNOSIS — I10 ESSENTIAL HYPERTENSION: ICD-10-CM

## 2018-08-01 RX ORDER — NEOMYCIN SULFATE, POLYMYXIN B SULFATE AND HYDROCORTISONE 10; 3.5; 1 MG/ML; MG/ML; [USP'U]/ML
3 SUSPENSION/ DROPS AURICULAR (OTIC) 3 TIMES DAILY
Qty: 10 ML | Refills: 0 | Status: SHIPPED | OUTPATIENT
Start: 2018-08-01 | End: 2018-08-11

## 2018-08-01 RX ORDER — HYDROCHLOROTHIAZIDE 25 MG/1
25 TABLET ORAL DAILY
Qty: 30 TAB | Refills: 0 | Status: SHIPPED | OUTPATIENT
Start: 2018-08-01 | End: 2018-10-31 | Stop reason: SDUPTHER

## 2018-08-01 RX ORDER — AMLODIPINE AND BENAZEPRIL HYDROCHLORIDE 5; 20 MG/1; MG/1
2 CAPSULE ORAL DAILY
Qty: 60 CAP | Refills: 0 | Status: SHIPPED | OUTPATIENT
Start: 2018-08-01 | End: 2018-10-31 | Stop reason: SDUPTHER

## 2018-08-01 RX ORDER — INSULIN GLARGINE 100 [IU]/ML
INJECTION, SOLUTION SUBCUTANEOUS
Qty: 10 PEN | Refills: 0 | Status: SHIPPED | OUTPATIENT
Start: 2018-08-01 | End: 2018-10-31 | Stop reason: SDUPTHER

## 2018-08-01 RX ORDER — INSULIN ASPART 100 [IU]/ML
INJECTION, SOLUTION INTRAVENOUS; SUBCUTANEOUS
Qty: 2 PEN | Refills: 0 | Status: SHIPPED | OUTPATIENT
Start: 2018-08-01 | End: 2018-08-21 | Stop reason: CLARIF

## 2018-08-01 NOTE — PROGRESS NOTES
Chief Complaint   Patient presents with    Ear Pain     Left ear pain since sunday, decreased hearing bilaterally. 1. Have you been to the ER, urgent care clinic since your last visit? Hospitalized since your last visit? No    2. Have you seen or consulted any other health care providers outside of the [address] [place] [address] since your last visit? Include any pap smears or colon screening.  No   PHQ over the last two weeks 8/1/2018   Little interest or pleasure in doing things Not at all   Feeling down, depressed, irritable, or hopeless Not at all   Total Score PHQ 2 0

## 2018-08-01 NOTE — MR AVS SNAPSHOT
Meryl White 
 
 
 [facility] [address] [id number] 
[phone] Patient: Sushma James MRN: [id number] XFL:[id number] Visit Information Date & Time Provider Department Dept. Phone Encounter #  
 8/1/2018  5:30 PM Omra Hebert PA-C [facility] [phone] [id number] Follow-up Instructions Return in about 4 weeks (around 8/29/2018) for sameer Soto. Upcoming Health Maintenance Date Due  
 EYE EXAM RETINAL OR DILATED Q1 3/31/2016 FOOT EXAM Q1 4/17/2017 ZOSTER VACCINE AGE 60> 1/20/2018 MICROALBUMIN Q1 3/16/2018 Influenza Age 5 to Adult 8/1/2018 HEMOGLOBIN A1C Q6M 11/14/2018 LIPID PANEL Q1 5/14/2019 COLONOSCOPY 6/10/2020 DTaP/Tdap/Td series (2 - Td) 9/22/2026 Allergies as of 8/1/2018  Review Complete On: 8/1/2018 By: Paty Noble LPN Severity Noted Reaction Type Reactions Bactrim [Sulfamethoprim Ds]  09/22/2016    Rash Penicillin G  07/01/2010    Rash Shellfish Containing Products  02/07/2012    Swelling Current Immunizations  Never Reviewed No immunizations on file. Not reviewed this visit You Were Diagnosed With   
  
 Codes Comments Acute swimmer's ear of left side    -  Primary ICD-10-CM: T70.590 ICD-9-CM: 380.12 Essential hypertension     ICD-10-CM: I10 
ICD-9-CM: 401.9 Type 2 diabetes mellitus with microalbuminuria, with long-term current use of insulin (HCC)     ICD-10-CM: E11.29, R80.9, Z79.4 ICD-9-CM: 250.40, 791.0, V58.67 Uncontrolled type 2 diabetes mellitus with complication, with long-term current use of insulin (HCC)     ICD-10-CM: E11.8, E11.65, Z79.4 ICD-9-CM: 250.82, V58.67 Eustachian tube disorder, bilateral     ICD-10-CM: H69.93 
ICD-9-CM: 381. 9 Vitals BP Pulse Temp Resp Height(growth percentile) Weight(growth percentile)  123/71 (BP 1 Location: Left arm, BP Patient Position: Sitting) (!) 56 98.5 °F (36.9 °C) (Oral) 18 5' 6\" (1.676 m) 262 lb (118.8 kg) SpO2 BMI Smoking Status 95% 42.29 kg/m2 Never Smoker Vitals History BMI and BSA Data Body Mass Index Body Surface Area  
 42.29 kg/m 2 2.35 m 2 Preferred Pharmacy Pharmacy Name Phone [facility] [address]) [phone] Your Updated Medication List  
  
   
This list is accurate as of 8/1/18 11:59 PM.  Always use your most recent med list. amLODIPine-benazepril 5-20 mg per capsule Commonly known as:  [place] Barges Take 2 Caps by mouth daily. atorvastatin 40 mg tablet Commonly known as:  LIPITOR Take 1 Tab by mouth daily. azithromycin 250 mg tablet Commonly known as:  [facility] Kidney Take two tablets today then one tablet daily  
  
 glucose blood VI test strips strip Commonly known as:  ACCU-CHEK GE PLUS TEST STRP  
CHECK BLOOD SUGARS 6 TIMES A DAY  
  
 hydroCHLOROthiazide 25 mg tablet Commonly known as:  HYDRODIURIL Take 1 Tab by mouth daily. insulin aspart U-100 100 unit/mL Inpn Commonly known as:  NovoLOG Flexpen U-100 Insulin AS PER SLIDING SCALE BEFORE MEALS  
  
 insulin glargine 100 unit/mL (3 mL) Inpn Commonly known as:  LANTUS,BASAGLAR  
40 units SQ qAM, 70 units SQ qPM  Indications: type 2 diabetes mellitus Insulin Needles (Disposable) 31 gauge x 3/16\" Ndle Commonly known as:  BD ULTRA-FINE MINI PEN NEEDLE  
AS DIRECTED Lancets Misc Use to check sugars  
  
 levothyroxine 112 mcg tablet Commonly known as:  SYNTHROID Take 1 Tab by mouth Daily (before breakfast). metFORMIN  mg tablet Commonly known as:  GLUCOPHAGE XR Take 1 Tab by mouth two (2) times a day. mupirocin calcium 2 % topical cream  
Commonly known as:  [facility] Apply  to affected area two (2) times a day. neomycin-polymyxin-hydrocortisone (buffered) 3.5-10,000-1 mg/mL-unit/mL-% otic suspension Commonly known as:  Dangelo [place] Administer 3 Drops in left ear three (3) times daily for 10 days. potassium chloride 10 mEq tablet Commonly known as:  KLOR-CON Take 1 Tab by mouth two (2) times a day. Prescriptions Sent to Pharmacy Refills  
 amLODIPine-benazepril (LOTREL) 5-20 mg per capsule 0 Sig: Take 2 Caps by mouth daily. Class: Normal  
 Pharmacy: [facility] Ph #: [phone] Route: Oral  
 hydroCHLOROthiazide (HYDRODIURIL) 25 mg tablet 0 Sig: Take 1 Tab by mouth daily. Class: Normal  
 Pharmacy: [facility] Ph #: [phone] Route: Oral  
 insulin glargine (LANTUS,BASAGLAR) 100 unit/mL (3 mL) inpn 0 Si units SQ qAM, 70 units SQ qPM  Indications: type 2 diabetes mellitus Class: Normal  
 Pharmacy: [facility] Ph #: [phone] insulin aspart U-100 (NOVOLOG FLEXPEN U-100 INSULIN) 100 unit/mL inpn 0 Sig: AS PER SLIDING SCALE BEFORE MEALS Class: Normal  
 Pharmacy: [facility] [address] Ph #: [phone]  
 glucose blood VI test strips (ACCU-CHEK GE PLUS TEST STRP) strip 0 Sig: CHECK BLOOD SUGARS 6 TIMES A DAY Class: Normal  
 Pharmacy: [facility] [address] Ph #: [phone]  
 neomycin-polymyxin-hydrocortisone, buffered, (PEDIOTIC) 3.5-10,000-1 mg/mL-unit/mL-% otic suspension 0 Sig: Administer 3 Drops in left ear three (3) times daily for 10 days.   
 Class: Normal  
 Pharmacy: [facility] [address] AT [address] [place] [address] ([facility])  #: [phone] Route: Left Ear We Performed the Following REFERRAL TO ENT-OTOLARYNGOLOGY [NQL32 Custom] Comments:  
 Dr Tony Jeronimo Follow-up Instructions Return in about 4 weeks (around 8/29/2018) for sameer Villeda. Referral Information Referral ID Referred By Referred To  
  
 [id number] Martyn Buerger ALMAS Not Available Visits Status Start Date End Date 1 New Request 8/1/18 8/1/19 If your referral has a status of pending review or denied, additional information will be sent to support the outcome of this decision. Patient Instructions Swimmer's Ear: Care Instructions Your Care Instructions Swimmer's ear (otitis externa) is inflammation or infection of the ear canal. This is the passage that leads from the outer ear to the eardrum. Any water, sand, or other debris that gets into the ear canal and stays there can cause swimmer's ear. Putting cotton swabs or other items in the ear to clean it can also cause this problem. Swimmer's ear can be very painful. But you can treat the pain and infection with medicines. You should feel better in a few days. Follow-up care is a key part of your treatment and safety. Be sure to make and go to all appointments, and call your doctor if you are having problems. It's also a good idea to know your test results and keep a list of the medicines you take. How can you care for yourself at home? Cleaning and care · Use antibiotic drops as your doctor directs. · Do not insert ear drops (other than the antibiotic ear drops) or anything else into the ear unless your doctor has told you to. · Avoid getting water in the ear until the problem clears up. Use cotton lightly coated with petroleum jelly as an earplug. Do not use plastic earplugs. · Use a hair dryer set on low to carefully dry the ear after you shower. · To ease ear pain, hold a warm washcloth against your ear. · Take pain medicines exactly as directed. ¨ If the doctor gave you a prescription medicine for pain, take it as prescribed. ¨ If you are not taking a prescription pain medicine, ask your doctor if you can take an over-the-counter medicine. Inserting ear drops · Warm the drops to body temperature by rolling the container in your hands. Or you can place it in a cup of warm water for a few minutes. · Lie down, with your ear facing up. · Place drops inside the ear. Follow your doctor's instructions (or the directions on the label) for how many drops to use. Gently wiggle the outer ear or pull the ear up and back to help the drops get into the ear. · It's important to keep the liquid in the ear canal for 3 to 5 minutes. When should you call for help? Call your doctor now or seek immediate medical care if: 
  · You have a new or higher fever.  
  · You have new or worse pain, swelling, warmth, or redness around or behind your ear.  
  · You have new or increasing pus or blood draining from your ear.  
 Watch closely for changes in your health, and be sure to contact your doctor if: 
  · You are not getting better after 2 days (48 hours). Where can you learn more? Go to http://clair-margaret.info/. Enter C706 in the search box to learn more about \"Swimmer's Ear: Care Instructions. \" Current as of: May 12, 2017 Content Version: 11.7 © 0172-7513 [employer]. Care instructions adapted under license by [employer] (which disclaims liability or warranty for this information). If you have questions about a medical condition or this instruction, always ask your healthcare professional. [address] any warranty or liability for your use of this information. Introducing [facility]!    
 Priyanka Reyes introduces [employer] patient portal. Now you can access parts of your medical record, email your doctor's office, and request medication refills online. 1. In your internet browser, go to [email or web address] 2. Click on the First Time User? Click Here link in the Sign In box. You will see the New Member Sign Up page. 3. Enter your [employer] Access Code exactly as it appears below. You will not need to use this code after youve completed the sign-up process. If you do not sign up before the expiration date, you must request a new code. · [employer] Access Code: [email or web address] Expires: 8/14/2018 10:37 AM 
 
4. Enter the last four digits of your Social Security Number (xxxx) and Date of Birth (mm/dd/yyyy) as indicated and click Submit. You will be taken to the next sign-up page. 5. Create a [employer] ID. This will be your [employer] login ID and cannot be changed, so think of one that is secure and easy to remember. 6. Create a [employer] password. You can change your password at any time. 7. Enter your Password Reset Question and Answer. This can be used at a later time if you forget your password. 8. Enter your e-mail address. You will receive e-mail notification when new information is available in [address]. 9. Click Sign Up. You can now view and download portions of your medical record. 10. Click the Download Summary menu link to download a portable copy of your medical information. If you have questions, please visit the Frequently Asked Questions section of the [employer] website. Remember, [employer] is NOT to be used for urgent needs. For medical emergencies, dial 911. Now available from your iPhone and Android! Please provide this summary of care documentation to your next provider. Your primary care clinician is listed as Juarez Blandon. If you have any questions after today's visit, please call [phone].

## 2018-08-02 NOTE — PATIENT INSTRUCTIONS

## 2018-08-02 NOTE — PROGRESS NOTES
HISTORY OF PRESENT ILLNESS  Tommy Hidalgo is a 59 y.o. male. HPI   Pt is c/o pain in his left ear for 2d w muffled hearing. Denies HA, dizziness, tinnitus, ST, congestion, cough, and drainage. Pt has a hx of eustachian tube dysfunction and sees THELMA ISIDRO [facility] ENT but is not happy with them. He previously saw Dr Kodak Choudhary for many years but he moved. Pt would like referral to a new ENT. He also is out of his meds and would like refills on his insulin, test strips, lotrel, and HCTZ. He is due for follow up and labs. Advised pt I would refill 1 month but he needed to f/u with Dr Dionte Muñoz for his routine visit. Allergies   Allergen Reactions    Bactrim [Sulfamethoprim Ds] Rash    Penicillin G Rash    Shellfish Containing Products Swelling       Current Outpatient Prescriptions   Medication Sig    amLODIPine-benazepril (LOTREL) 5-20 mg per capsule Take 2 Caps by mouth daily.  hydroCHLOROthiazide (HYDRODIURIL) 25 mg tablet Take 1 Tab by mouth daily.  insulin glargine (LANTUS,BASAGLAR) 100 unit/mL (3 mL) inpn 40 units SQ qAM, 70 units SQ qPM  Indications: type 2 diabetes mellitus    insulin aspart U-100 (NOVOLOG FLEXPEN U-100 INSULIN) 100 unit/mL inpn AS PER SLIDING SCALE BEFORE MEALS    glucose blood VI test strips (ACCU-CHEK GE PLUS TEST STRP) strip CHECK BLOOD SUGARS 6 TIMES A DAY    neomycin-polymyxin-hydrocortisone, buffered, (PEDIOTIC) 3.5-10,000-1 mg/mL-unit/mL-% otic suspension Administer 3 Drops in left ear three (3) times daily for 10 days.  levothyroxine (SYNTHROID) 112 mcg tablet Take 1 Tab by mouth Daily (before breakfast).  atorvastatin (LIPITOR) 40 mg tablet Take 1 Tab by mouth daily.  metFORMIN ER (GLUCOPHAGE XR) 750 mg tablet Take 1 Tab by mouth two (2) times a day.  potassium chloride (KLOR-CON) 10 mEq tablet Take 1 Tab by mouth two (2) times a day.     azithromycin (ZITHROMAX Z-LEVON) 250 mg tablet Take two tablets today then one tablet daily    Insulin Needles, Disposable, (BD INSULIN PEN NEEDLE UF MINI) 31 gauge x 3/16\" ndle AS DIRECTED    mupirocin calcium (BACTROBAN) 2 % topical cream Apply  to affected area two (2) times a day.  Lancets misc Use to check sugars     No current facility-administered medications for this visit. Review of Systems   Constitutional: Negative. Negative for chills, fever and malaise/fatigue. HENT: Positive for ear pain (left). Negative for congestion, sore throat and tinnitus. Eyes: Negative. Negative for blurred vision, double vision and photophobia. Respiratory: Negative. Negative for cough, shortness of breath and wheezing. Cardiovascular: Negative. Negative for chest pain, palpitations and leg swelling. Gastrointestinal: Negative. Negative for abdominal pain, heartburn, nausea and vomiting. Genitourinary: Negative. Negative for dysuria, frequency, hematuria and urgency. Musculoskeletal: Negative. Negative for back pain, joint pain, myalgias and neck pain. Skin: Negative. Negative for itching and rash. Neurological: Negative. Negative for dizziness, tingling, tremors and headaches. Psychiatric/Behavioral: Negative. Negative for depression and memory loss. The patient is not nervous/anxious and does not have insomnia. Visit Vitals    /71 (BP 1 Location: Left arm, BP Patient Position: Sitting)    Pulse (!) 56    Temp 98.5 °F (36.9 °C) (Oral)    Resp 18    Ht 5' 6\" (1.676 m)    Wt 262 lb (118.8 kg)    SpO2 95%    BMI 42.29 kg/m2       Physical Exam   Constitutional: He is oriented to person, place, and time. He appears well-developed and well-nourished. No distress. Pt is obese   HENT:   Head: Normocephalic and atraumatic. Right Ear: Tympanic membrane and external ear normal.   Left Ear: Tympanic membrane normal. There is swelling (of canal) and tenderness. Nose: Nose normal.   Mouth/Throat: Uvula is midline. Cardiovascular: Normal rate, regular rhythm and normal heart sounds. Pulmonary/Chest: Effort normal and breath sounds normal.   Neurological: He is alert and oriented to person, place, and time. Skin: He is not diaphoretic. Psychiatric: He has a normal mood and affect. His behavior is normal.       ASSESSMENT and PLAN    ICD-10-CM ICD-9-CM    1. Acute swimmer's ear of left side H60.332 380.12 neomycin-polymyxin-hydrocortisone, buffered, (PEDIOTIC) 3.5-10,000-1 mg/mL-unit/mL-% otic suspension   2. Essential hypertension I10 401.9 amLODIPine-benazepril (LOTREL) 5-20 mg per capsule      hydroCHLOROthiazide (HYDRODIURIL) 25 mg tablet   3. Type 2 diabetes mellitus with microalbuminuria, with long-term current use of insulin ([facility]) E11.29 250.40 insulin glargine (LANTUS,BASAGLAR) 100 unit/mL (3 mL) inpn    R80.9 791.0 insulin aspart U-100 (NOVOLOG FLEXPEN U-100 INSULIN) 100 unit/mL inpn    Z79.4 V58.67    4. Uncontrolled type 2 diabetes mellitus with complication, with long-term current use of insulin ([facility]) E11.8 250.82 glucose blood VI test strips (ACCU-CHEK GE PLUS TEST STRP) strip    E11.65 V58.67     Z79.4     5. Eustachian tube disorder, bilateral H69.93 381.9 REFERRAL TO ENT-OTOLARYNGOLOGY     Follow-up Disposition:  Return in about 4 weeks (around 8/29/2018) for sameer Ni. Pt expressed understanding of visit summary and plans for any follow ups or referrals as well as any medications prescribed.

## 2018-08-21 ENCOUNTER — TELEPHONE (OUTPATIENT)
Dept: INTERNAL MEDICINE CLINIC | Age: 58
End: 2018-08-21

## 2018-08-21 RX ORDER — INSULIN LISPRO 100 [IU]/ML
INJECTION, SOLUTION INTRAVENOUS; SUBCUTANEOUS
Qty: 10 PEN | Refills: 1 | Status: SHIPPED | OUTPATIENT
Start: 2018-08-21 | End: 2018-11-02 | Stop reason: SDUPTHER

## 2018-08-21 NOTE — TELEPHONE ENCOUNTER
Pt called he needs Humalog pens 100Units/3ml. It reads pt to inject 30 units on a sliding scale.                      # 10 pens and 1 refill

## 2018-11-06 DIAGNOSIS — I10 ESSENTIAL HYPERTENSION: ICD-10-CM

## 2018-11-07 RX ORDER — POTASSIUM CHLORIDE 750 MG/1
10 TABLET, EXTENDED RELEASE ORAL 2 TIMES DAILY
Qty: 60 TAB | Refills: 1 | Status: SHIPPED | OUTPATIENT
Start: 2018-11-07 | End: 2019-04-25 | Stop reason: SDUPTHER

## 2018-11-14 DIAGNOSIS — R80.9 TYPE 2 DIABETES MELLITUS WITH MICROALBUMINURIA, WITH LONG-TERM CURRENT USE OF INSULIN (HCC): ICD-10-CM

## 2018-11-14 DIAGNOSIS — E11.29 TYPE 2 DIABETES MELLITUS WITH MICROALBUMINURIA, WITH LONG-TERM CURRENT USE OF INSULIN (HCC): ICD-10-CM

## 2018-11-14 DIAGNOSIS — Z79.4 TYPE 2 DIABETES MELLITUS WITH MICROALBUMINURIA, WITH LONG-TERM CURRENT USE OF INSULIN (HCC): ICD-10-CM

## 2018-11-14 RX ORDER — PEN NEEDLE, DIABETIC 31 GX3/16"
NEEDLE, DISPOSABLE MISCELLANEOUS
Qty: 100 PEN NEEDLE | Refills: 1 | Status: SHIPPED | OUTPATIENT
Start: 2018-11-14 | End: 2019-04-25 | Stop reason: SDUPTHER

## 2018-11-14 RX ORDER — INSULIN GLARGINE 100 [IU]/ML
INJECTION, SOLUTION SUBCUTANEOUS
Qty: 30 ML | Refills: 1 | Status: SHIPPED | OUTPATIENT
Start: 2018-11-14 | End: 2018-11-16 | Stop reason: ALTCHOICE

## 2018-11-14 NOTE — TELEPHONE ENCOUNTER
Blood work in the morning and appointment on the 20th with [address] [place] [address],[address]

## 2018-11-15 ENCOUNTER — LAB ONLY (OUTPATIENT)
Dept: INTERNAL MEDICINE CLINIC | Age: 58
End: 2018-11-15

## 2018-11-15 DIAGNOSIS — E78.2 MIXED HYPERLIPIDEMIA: ICD-10-CM

## 2018-11-15 DIAGNOSIS — I10 ESSENTIAL HYPERTENSION: ICD-10-CM

## 2018-11-15 DIAGNOSIS — E03.9 ACQUIRED HYPOTHYROIDISM: ICD-10-CM

## 2018-11-16 LAB
ALBUMIN SERPL-MCNC: 4.1 G/DL (ref 3.6–4.8)
ALBUMIN/GLOB SERPL: 1.4 {RATIO} (ref 1.2–2.2)
ALP SERPL-CCNC: 64 IU/L (ref 39–117)
ALT SERPL-CCNC: 52 IU/L (ref 0–44)
AST SERPL-CCNC: 38 IU/L (ref 0–40)
BASOPHILS # BLD AUTO: 0 X10E3/UL (ref 0–0.2)
BASOPHILS NFR BLD AUTO: 1 %
BILIRUB SERPL-MCNC: 0.4 MG/DL (ref 0–1.2)
BUN SERPL-MCNC: 14 MG/DL (ref 8–27)
BUN/CREAT SERPL: 16 (ref 10–24)
CALCIUM SERPL-MCNC: 9.3 MG/DL (ref 8.6–10.2)
CHLORIDE SERPL-SCNC: 107 MMOL/L (ref 96–106)
CHOLEST SERPL-MCNC: 233 MG/DL (ref 100–199)
CO2 SERPL-SCNC: 21 MMOL/L (ref 20–29)
CREAT SERPL-MCNC: 0.89 MG/DL (ref 0.76–1.27)
EOSINOPHIL # BLD AUTO: 0.2 X10E3/UL (ref 0–0.4)
EOSINOPHIL NFR BLD AUTO: 3 %
ERYTHROCYTE [DISTWIDTH] IN BLOOD BY AUTOMATED COUNT: 14.3 % (ref 12.3–15.4)
EST. AVERAGE GLUCOSE BLD GHB EST-MCNC: 192 MG/DL
GLOBULIN SER CALC-MCNC: 3 G/DL (ref 1.5–4.5)
GLUCOSE SERPL-MCNC: 142 MG/DL (ref 65–99)
HBA1C MFR BLD: 8.3 % (ref 4.8–5.6)
HCT VFR BLD AUTO: 42.4 % (ref 37.5–51)
HDLC SERPL-MCNC: 36 MG/DL
HGB BLD-MCNC: 14.2 G/DL (ref 13–17.7)
IMM GRANULOCYTES # BLD: 0 X10E3/UL (ref 0–0.1)
IMM GRANULOCYTES NFR BLD: 0 %
LDLC SERPL CALC-MCNC: 148 MG/DL (ref 0–99)
LYMPHOCYTES # BLD AUTO: 1.5 X10E3/UL (ref 0.7–3.1)
LYMPHOCYTES NFR BLD AUTO: 21 %
MCH RBC QN AUTO: 29.6 PG (ref 26.6–33)
MCHC RBC AUTO-ENTMCNC: 33.5 G/DL (ref 31.5–35.7)
MCV RBC AUTO: 89 FL (ref 79–97)
MONOCYTES # BLD AUTO: 0.5 X10E3/UL (ref 0.1–0.9)
MONOCYTES NFR BLD AUTO: 8 %
NEUTROPHILS # BLD AUTO: 4.6 X10E3/UL (ref 1.4–7)
NEUTROPHILS NFR BLD AUTO: 67 %
PLATELET # BLD AUTO: 201 X10E3/UL (ref 150–379)
POTASSIUM SERPL-SCNC: 3.8 MMOL/L (ref 3.5–5.2)
PROT SERPL-MCNC: 7.1 G/DL (ref 6–8.5)
RBC # BLD AUTO: 4.79 X10E6/UL (ref 4.14–5.8)
SODIUM SERPL-SCNC: 144 MMOL/L (ref 134–144)
TRIGL SERPL-MCNC: 244 MG/DL (ref 0–149)
TSH SERPL DL<=0.005 MIU/L-ACNC: 1.37 UIU/ML (ref 0.45–4.5)
VLDLC SERPL CALC-MCNC: 49 MG/DL (ref 5–40)
WBC # BLD AUTO: 6.9 X10E3/UL (ref 3.4–10.8)

## 2018-11-16 RX ORDER — INSULIN GLARGINE 100 [IU]/ML
INJECTION, SOLUTION SUBCUTANEOUS
Qty: 6 PEN | Refills: 1 | Status: SHIPPED | OUTPATIENT
Start: 2018-11-16 | End: 2019-04-25 | Stop reason: SDUPTHER

## 2018-11-20 ENCOUNTER — OFFICE VISIT (OUTPATIENT)
Dept: INTERNAL MEDICINE CLINIC | Age: 58
End: 2018-11-20

## 2018-11-20 ENCOUNTER — TELEPHONE (OUTPATIENT)
Dept: INTERNAL MEDICINE CLINIC | Age: 58
End: 2018-11-20

## 2018-11-20 VITALS
DIASTOLIC BLOOD PRESSURE: 74 MMHG | BODY MASS INDEX: 42.11 KG/M2 | RESPIRATION RATE: 18 BRPM | TEMPERATURE: 97.7 F | OXYGEN SATURATION: 96 % | HEART RATE: 61 BPM | HEIGHT: 66 IN | WEIGHT: 262 LBS | SYSTOLIC BLOOD PRESSURE: 152 MMHG

## 2018-11-20 DIAGNOSIS — I10 ESSENTIAL HYPERTENSION: ICD-10-CM

## 2018-11-20 DIAGNOSIS — E78.2 MIXED HYPERLIPIDEMIA: ICD-10-CM

## 2018-11-20 LAB
ALBUMIN UR QL STRIP: 150 MG/L
CREATININE, URINE POC: 200 MG/DL
MICROALBUMIN/CREAT RATIO POC: NORMAL MG/G

## 2018-11-20 RX ORDER — TRIAMTERENE/HYDROCHLOROTHIAZID 37.5-25 MG
1 TABLET ORAL DAILY
Qty: 90 TAB | Refills: 1 | Status: SHIPPED | OUTPATIENT
Start: 2018-11-20 | End: 2019-01-25 | Stop reason: SDUPTHER

## 2018-11-26 ENCOUNTER — TELEPHONE (OUTPATIENT)
Dept: INTERNAL MEDICINE CLINIC | Age: 58
End: 2018-11-26

## 2018-11-26 NOTE — TELEPHONE ENCOUNTER
Pt called and says the pharmacy never received his Humalog insulin refills that he needed. His Aunt had  and he is leaving to go out of town to the . I called in his  Humalog insulin with 2 refills.

## 2019-01-25 DIAGNOSIS — I10 ESSENTIAL HYPERTENSION: ICD-10-CM

## 2019-01-25 RX ORDER — TRIAMTERENE/HYDROCHLOROTHIAZID 37.5-25 MG
1 TABLET ORAL DAILY
Qty: 90 TAB | Refills: 1 | Status: SHIPPED | OUTPATIENT
Start: 2019-01-25 | End: 2019-04-25 | Stop reason: SDUPTHER

## 2019-01-31 RX ORDER — INSULIN PUMP SYRINGE, 3 ML
EACH MISCELLANEOUS
Qty: 1 KIT | Refills: 0 | Status: SHIPPED | OUTPATIENT
Start: 2019-01-31

## 2019-04-25 ENCOUNTER — OFFICE VISIT (OUTPATIENT)
Dept: INTERNAL MEDICINE CLINIC | Age: 59
End: 2019-04-25

## 2019-04-25 VITALS
BODY MASS INDEX: 41.3 KG/M2 | SYSTOLIC BLOOD PRESSURE: 114 MMHG | HEIGHT: 66 IN | OXYGEN SATURATION: 97 % | WEIGHT: 257 LBS | DIASTOLIC BLOOD PRESSURE: 70 MMHG | HEART RATE: 71 BPM | TEMPERATURE: 98.1 F | RESPIRATION RATE: 18 BRPM

## 2019-04-25 DIAGNOSIS — I10 ESSENTIAL HYPERTENSION: ICD-10-CM

## 2019-04-25 DIAGNOSIS — R80.9 TYPE 2 DIABETES MELLITUS WITH MICROALBUMINURIA, WITH LONG-TERM CURRENT USE OF INSULIN (HCC): ICD-10-CM

## 2019-04-25 DIAGNOSIS — Z00.00 ROUTINE GENERAL MEDICAL EXAMINATION AT A HEALTH CARE FACILITY: Primary | ICD-10-CM

## 2019-04-25 DIAGNOSIS — Z79.4 TYPE 2 DIABETES MELLITUS WITH MICROALBUMINURIA, WITH LONG-TERM CURRENT USE OF INSULIN (HCC): ICD-10-CM

## 2019-04-25 DIAGNOSIS — E78.5 HYPERLIPIDEMIA, UNSPECIFIED HYPERLIPIDEMIA TYPE: ICD-10-CM

## 2019-04-25 DIAGNOSIS — E11.29 TYPE 2 DIABETES MELLITUS WITH MICROALBUMINURIA, WITH LONG-TERM CURRENT USE OF INSULIN (HCC): ICD-10-CM

## 2019-04-25 DIAGNOSIS — Z12.5 PROSTATE CANCER SCREENING: ICD-10-CM

## 2019-04-25 DIAGNOSIS — E03.9 HYPOTHYROIDISM, UNSPECIFIED TYPE: ICD-10-CM

## 2019-04-25 RX ORDER — TRIAMTERENE/HYDROCHLOROTHIAZID 37.5-25 MG
1 TABLET ORAL DAILY
Qty: 30 TAB | Refills: 5 | Status: SHIPPED | OUTPATIENT
Start: 2019-04-25

## 2019-04-25 RX ORDER — AMLODIPINE AND BENAZEPRIL HYDROCHLORIDE 5; 20 MG/1; MG/1
2 CAPSULE ORAL DAILY
Qty: 60 CAP | Refills: 5 | Status: SHIPPED | OUTPATIENT
Start: 2019-04-25

## 2019-04-25 RX ORDER — ATORVASTATIN CALCIUM 40 MG/1
40 TABLET, FILM COATED ORAL DAILY
Qty: 30 TAB | Refills: 5 | Status: SHIPPED | OUTPATIENT
Start: 2019-04-25

## 2019-04-25 RX ORDER — INSULIN GLARGINE 100 [IU]/ML
INJECTION, SOLUTION SUBCUTANEOUS
Qty: 10 PEN | Refills: 5 | Status: SHIPPED | OUTPATIENT
Start: 2019-04-25 | End: 2019-05-07 | Stop reason: ALTCHOICE

## 2019-04-25 RX ORDER — INSULIN LISPRO 100 [IU]/ML
INJECTION, SOLUTION INTRAVENOUS; SUBCUTANEOUS
Qty: 30 ML | Refills: 5 | Status: SHIPPED | OUTPATIENT
Start: 2019-04-25 | End: 2019-10-27 | Stop reason: SDUPTHER

## 2019-04-25 RX ORDER — POTASSIUM CHLORIDE 750 MG/1
10 TABLET, EXTENDED RELEASE ORAL 2 TIMES DAILY
Qty: 180 TAB | Refills: 5 | Status: SHIPPED | OUTPATIENT
Start: 2019-04-25

## 2019-04-25 RX ORDER — LEVOTHYROXINE SODIUM 112 UG/1
112 TABLET ORAL
Qty: 30 TAB | Refills: 5 | Status: SHIPPED | OUTPATIENT
Start: 2019-04-25

## 2019-04-25 RX ORDER — PEN NEEDLE, DIABETIC 31 GX3/16"
NEEDLE, DISPOSABLE MISCELLANEOUS
Qty: 100 PEN NEEDLE | Refills: 5 | Status: SHIPPED | OUTPATIENT
Start: 2019-04-25

## 2019-04-25 RX ORDER — METFORMIN HYDROCHLORIDE 750 MG/1
750 TABLET, EXTENDED RELEASE ORAL 2 TIMES DAILY
Qty: 60 TAB | Refills: 5 | Status: SHIPPED | OUTPATIENT
Start: 2019-04-25

## 2019-04-25 NOTE — PATIENT INSTRUCTIONS

## 2019-04-25 NOTE — PROGRESS NOTES
Chief Complaint   Patient presents with    Diabetes    Thyroid Problem    Hypertension    Cholesterol Problem   1. Have you been to the ER, urgent care clinic since your last visit? Hospitalized since your last visit? No    2. Have you seen or consulted any other health care providers outside of the [address] [place] [address] since your last visit? Include any pap smears or colon screening.  No

## 2019-04-28 PROBLEM — E03.9 HYPOTHYROIDISM: Status: ACTIVE | Noted: 2019-04-28

## 2019-04-28 NOTE — PROGRESS NOTES
Subjective:   Patient is a 62y.o. year old male who presents for Diabetes; Thyroid Problem; Hypertension; and Cholesterol Problem  CPE:  He is not fasting. Will come back for fasting labs. He also hasn't been here in a long time and needs to have f/u appt for his medical problems and for med refills. Current problems needing to be addressed:  1. DM:  Last seen in Nov.  His A1c was 8.3. Has been on Basaglar and Humalog. He wasn't using as much insulin when had the c-diff because wasn't eating. Currently taking 70 units qAM and 65 qPM Basaglar but ran out 4 days ago. Humalog with meals. Takes 30 units in the morning, 40-50 units at lunch and 60 units with dinner. But less if doesn't eat many carbs. Sugars:  130 fasting, and then 175 to 350 after meals. 2.  HTN:  BP is well controlled. On Lotrel and Maxzide. 3.  Hypothyroidism: On synthroid 112 mcg. Not having any symptoms    4. Hyperlipidemia:  On Lipitor 40 mg. Doing well with no side effects    5. C-diff:  Was in the hospital with diverticulitis and then ended up with diarrhea. Went back to ED. Had c-diff. Ended up on Flagyl for that. Doing better now. He just finished the Flagyl and is taking probiotics. Review of Systems   Constitutional: Negative. HENT: Negative. Eyes: Negative. Respiratory: Negative. Cardiovascular: Negative. Gastrointestinal: Negative. Genitourinary: Negative. Musculoskeletal: Negative. Skin: Negative. Neurological: Negative. Psychiatric/Behavioral: Negative. Current Outpatient Medications on File Prior to Visit   Medication Sig Dispense Refill    Blood-Glucose Meter (CONTOUR METER) monitoring kit Use to check sugars 6 times daily 1 Kit 0    mupirocin calcium (BACTROBAN) 2 % topical cream Apply  to affected area two (2) times a day.  15 g 0    Lancets misc Use to check sugars 2 Package 11    Blood-Glucose Meter monitoring kit One touch ultra 2 meter for testing patient blood sugar. DX E11.29 1 Kit 0    azithromycin (ZITHROMAX Z-LEVON) 250 mg tablet Take two tablets today then one tablet daily 6 Tab 0     No current facility-administered medications on file prior to visit. Reviewed PmHx, RxHx, FmHx, SocHx, AllgHx and updated and dated in the chart. Nurse notes were reviewed and are correct    Objective:     Vitals:    04/25/19 1624   BP: 114/70   Pulse: 71   Resp: 18   Temp: 98.1 °F (36.7 °C)   TempSrc: Oral   SpO2: 97%   Weight: 257 lb (116.6 kg)   Height: 5' 6\" (1.676 m)     Physical Exam   Constitutional: He is oriented to person, place, and time. He appears well-developed and well-nourished. No distress. HENT:   Head: Normocephalic and atraumatic. Right Ear: External ear normal.   Left Ear: External ear normal.   Nose: Nose normal.   Mouth/Throat: Oropharynx is clear and moist.   Eyes: Pupils are equal, round, and reactive to light. Conjunctivae and EOM are normal. No scleral icterus. Neck: Normal range of motion. Neck supple. Carotid bruit is not present. No tracheal deviation present. No thyromegaly present. Cardiovascular: Normal rate, regular rhythm, normal heart sounds and intact distal pulses. Exam reveals no gallop and no friction rub. No murmur heard. Pulmonary/Chest: Effort normal and breath sounds normal. He has no wheezes. He has no rales. Abdominal: Soft. Bowel sounds are normal. He exhibits no distension. There is no hepatosplenomegaly. There is no tenderness. Musculoskeletal: Normal range of motion. He exhibits no edema or tenderness. Lymphadenopathy:     He has no cervical adenopathy. Neurological: He is alert and oriented to person, place, and time. Skin: Skin is warm and dry. No rash noted. No pallor. Psychiatric: He has a normal mood and affect. His behavior is normal. Judgment normal.   Nursing note and vitals reviewed. Assessment/ Plan:     Diagnoses and all orders for this visit:    1.  Routine general medical examination at a health care facility  -     METABOLIC PANEL, COMPREHENSIVE; Future  -     CBC WITH AUTOMATED DIFF; Future  -     LIPID PANEL; Future  -     TSH 3RD GENERATION; Future  -     HEMOGLOBIN A1C WITH EAG; Future  -     URINALYSIS W/ RFLX MICROSCOPIC; Future    2. Hypothyroidism, unspecified type  -     levothyroxine (SYNTHROID) 112 mcg tablet; Take 1 Tab by mouth Daily (before breakfast). 3. Essential hypertension  -     triamterene-hydroCHLOROthiazide (MAXZIDE) 37.5-25 mg per tablet; Take 1 Tab by mouth daily. -     amLODIPine-benazepril (LOTREL) 5-20 mg per capsule; Take 2 Caps by mouth daily. -     potassium chloride (KLOR-CON) 10 mEq tablet; Take 1 Tab by mouth two (2) times a day. 4. Hyperlipidemia, unspecified hyperlipidemia type  -     atorvastatin (LIPITOR) 40 mg tablet; Take 1 Tab by mouth daily. 5. Type 2 diabetes mellitus with microalbuminuria, with long-term current use of insulin ([address]):  Has been on and off the [employer] [address] [employer] so expect A1c to be high. Told him he has to come every 3 months to check on the DM. Restart the old dose of Basaglar and continue the current doses of Humalog while keeping close eye on sugars. Work on diet and exercise  -     insulin lispro (HUMALOG KWIKPEN INSULIN) 100 unit/mL kwikpen; INJECT 30-60 UNITS SUBCUTANEOUSLY THREE TIMES DAILY WITH MEALS, BASED ON SLIDING SCALE  -     metFORMIN ER (GLUCOPHAGE XR) 750 mg tablet; Take 1 Tab by mouth two (2) times a day. -     Insulin Needles, Disposable, (BD ULTRA-FINE MINI PEN NEEDLE) 31 gauge x 3/16\" ndle; AS DIRECTED  -     insulin glargine (LANTUS,BASAGLAR) 100 unit/mL (3 mL) inpn; Inject 70 units every morning and 65 units every evening  -     HEMOGLOBIN A1C WITH EAG; Future  -     glucose blood VI test strips (ASCENSIA CONTOUR) strip; Use to check sugars 6 times daily    6. Prostate cancer screening  -     PSA SCREENING (SCREENING);  Future       I have discussed the diagnosis with the patient and the intended plan as seen in the above orders. The patient verbalized understanding and agrees with the plan. Follow-up and Dispositions    · Return in about 3 months (around 7/25/2019) for combo clinic.  will also come in [address] [place] [address] for fasting labs.          Hunter Acuna MD

## 2019-04-29 ENCOUNTER — LAB ONLY (OUTPATIENT)
Dept: INTERNAL MEDICINE CLINIC | Age: 59
End: 2019-04-29

## 2019-04-29 DIAGNOSIS — E11.29 TYPE 2 DIABETES MELLITUS WITH MICROALBUMINURIA, WITH LONG-TERM CURRENT USE OF INSULIN (HCC): ICD-10-CM

## 2019-04-29 DIAGNOSIS — E03.9 HYPOTHYROIDISM, UNSPECIFIED TYPE: Primary | ICD-10-CM

## 2019-04-29 DIAGNOSIS — Z79.4 TYPE 2 DIABETES MELLITUS WITH MICROALBUMINURIA, WITH LONG-TERM CURRENT USE OF INSULIN (HCC): ICD-10-CM

## 2019-04-29 DIAGNOSIS — R80.9 TYPE 2 DIABETES MELLITUS WITH MICROALBUMINURIA, WITH LONG-TERM CURRENT USE OF INSULIN (HCC): ICD-10-CM

## 2019-04-29 DIAGNOSIS — I10 ESSENTIAL HYPERTENSION: ICD-10-CM

## 2019-04-29 DIAGNOSIS — E78.5 HYPERLIPIDEMIA, UNSPECIFIED HYPERLIPIDEMIA TYPE: ICD-10-CM

## 2019-04-30 LAB
ALBUMIN SERPL-MCNC: 3.9 G/DL (ref 3.6–4.8)
ALBUMIN/GLOB SERPL: 1.2 {RATIO} (ref 1.2–2.2)
ALP SERPL-CCNC: 65 IU/L (ref 39–117)
ALT SERPL-CCNC: 49 IU/L (ref 0–44)
APPEARANCE UR: CLEAR
AST SERPL-CCNC: 35 IU/L (ref 0–40)
BACTERIA #/AREA URNS HPF: NORMAL /[HPF]
BASOPHILS # BLD AUTO: 0 X10E3/UL (ref 0–0.2)
BASOPHILS NFR BLD AUTO: 1 %
BILIRUB SERPL-MCNC: 0.4 MG/DL (ref 0–1.2)
BILIRUB UR QL STRIP: NEGATIVE
BUN SERPL-MCNC: 20 MG/DL (ref 8–27)
BUN/CREAT SERPL: 20 (ref 10–24)
CALCIUM SERPL-MCNC: 9.2 MG/DL (ref 8.6–10.2)
CASTS URNS QL MICRO: NORMAL /LPF
CHLORIDE SERPL-SCNC: 106 MMOL/L (ref 96–106)
CHOLEST SERPL-MCNC: 220 MG/DL (ref 100–199)
CO2 SERPL-SCNC: 21 MMOL/L (ref 20–29)
COLOR UR: YELLOW
CREAT SERPL-MCNC: 1 MG/DL (ref 0.76–1.27)
EOSINOPHIL # BLD AUTO: 0.2 X10E3/UL (ref 0–0.4)
EOSINOPHIL NFR BLD AUTO: 3 %
EPI CELLS #/AREA URNS HPF: NORMAL /HPF (ref 0–10)
ERYTHROCYTE [DISTWIDTH] IN BLOOD BY AUTOMATED COUNT: 14.2 % (ref 12.3–15.4)
EST. AVERAGE GLUCOSE BLD GHB EST-MCNC: 171 MG/DL
GLOBULIN SER CALC-MCNC: 3.3 G/DL (ref 1.5–4.5)
GLUCOSE SERPL-MCNC: 161 MG/DL (ref 65–99)
GLUCOSE UR QL: NEGATIVE
HBA1C MFR BLD: 7.6 % (ref 4.8–5.6)
HCT VFR BLD AUTO: 44.2 % (ref 37.5–51)
HDLC SERPL-MCNC: 40 MG/DL
HGB BLD-MCNC: 14.9 G/DL (ref 13–17.7)
HGB UR QL STRIP: NEGATIVE
IMM GRANULOCYTES # BLD AUTO: 0 X10E3/UL (ref 0–0.1)
IMM GRANULOCYTES NFR BLD AUTO: 0 %
KETONES UR QL STRIP: NEGATIVE
LDLC SERPL CALC-MCNC: 135 MG/DL (ref 0–99)
LEUKOCYTE ESTERASE UR QL STRIP: NEGATIVE
LYMPHOCYTES # BLD AUTO: 1.5 X10E3/UL (ref 0.7–3.1)
LYMPHOCYTES NFR BLD AUTO: 23 %
MCH RBC QN AUTO: 30.2 PG (ref 26.6–33)
MCHC RBC AUTO-ENTMCNC: 33.7 G/DL (ref 31.5–35.7)
MCV RBC AUTO: 90 FL (ref 79–97)
MICRO URNS: ABNORMAL
MONOCYTES # BLD AUTO: 0.6 X10E3/UL (ref 0.1–0.9)
MONOCYTES NFR BLD AUTO: 9 %
NEUTROPHILS # BLD AUTO: 4 X10E3/UL (ref 1.4–7)
NEUTROPHILS NFR BLD AUTO: 64 %
NITRITE UR QL STRIP: NEGATIVE
PH UR STRIP: 5 [PH] (ref 5–7.5)
PLATELET # BLD AUTO: 200 X10E3/UL (ref 150–379)
POTASSIUM SERPL-SCNC: 3.9 MMOL/L (ref 3.5–5.2)
PROT SERPL-MCNC: 7.2 G/DL (ref 6–8.5)
PROT UR QL STRIP: ABNORMAL
PSA SERPL-MCNC: 0.2 NG/ML (ref 0–4)
RBC # BLD AUTO: 4.93 X10E6/UL (ref 4.14–5.8)
RBC #/AREA URNS HPF: NORMAL /HPF (ref 0–2)
SODIUM SERPL-SCNC: 142 MMOL/L (ref 134–144)
SP GR UR: 1.02 (ref 1–1.03)
TRIGL SERPL-MCNC: 223 MG/DL (ref 0–149)
TSH SERPL DL<=0.005 MIU/L-ACNC: 1.37 UIU/ML (ref 0.45–4.5)
UROBILINOGEN UR STRIP-MCNC: 0.2 MG/DL (ref 0.2–1)
VLDLC SERPL CALC-MCNC: 45 MG/DL (ref 5–40)
WBC # BLD AUTO: 6.3 X10E3/UL (ref 3.4–10.8)
WBC #/AREA URNS HPF: NORMAL /HPF (ref 0–5)

## 2019-04-30 NOTE — PROGRESS NOTES
Let him know his blood work looked pretty good. His A1c was 7.6. Now that he is back on his Basaglar, if he stays on that and the Lantus every day as prescribed, I expect the A1c will go down further. Our goal is less than 7. He can try to be careful about his diet and work on exercise. His cholesterol was high. 220 with . That's a little high for someone taking Lipitor 40 mg. Confirm if he's remembering it every day and if he's taking it in the evening. It works better in the evening. But if he forgets to take it in the evening then he can try the morning and see if he can remember that every day. Most important is taking every day. Other blood work looked good.

## 2019-05-07 DIAGNOSIS — R80.9 TYPE 2 DIABETES MELLITUS WITH MICROALBUMINURIA, WITH LONG-TERM CURRENT USE OF INSULIN (HCC): Primary | ICD-10-CM

## 2019-05-07 DIAGNOSIS — Z79.4 TYPE 2 DIABETES MELLITUS WITH MICROALBUMINURIA, WITH LONG-TERM CURRENT USE OF INSULIN (HCC): Primary | ICD-10-CM

## 2019-05-07 DIAGNOSIS — E11.29 TYPE 2 DIABETES MELLITUS WITH MICROALBUMINURIA, WITH LONG-TERM CURRENT USE OF INSULIN (HCC): Primary | ICD-10-CM

## 2019-05-07 RX ORDER — INSULIN GLARGINE 100 [IU]/ML
INJECTION, SOLUTION SUBCUTANEOUS
Qty: 10 PEN | Refills: 5 | Status: SHIPPED | OUTPATIENT
Start: 2019-05-07 | End: 2019-11-26 | Stop reason: SDUPTHER

## 2019-08-23 ENCOUNTER — TELEPHONE (OUTPATIENT)
Dept: INTERNAL MEDICINE CLINIC | Age: 59
End: 2019-08-23

## 2019-10-27 DIAGNOSIS — R80.9 TYPE 2 DIABETES MELLITUS WITH MICROALBUMINURIA, WITH LONG-TERM CURRENT USE OF INSULIN (HCC): ICD-10-CM

## 2019-10-27 DIAGNOSIS — Z79.4 TYPE 2 DIABETES MELLITUS WITH MICROALBUMINURIA, WITH LONG-TERM CURRENT USE OF INSULIN (HCC): ICD-10-CM

## 2019-10-27 DIAGNOSIS — E11.29 TYPE 2 DIABETES MELLITUS WITH MICROALBUMINURIA, WITH LONG-TERM CURRENT USE OF INSULIN (HCC): ICD-10-CM

## 2019-10-27 RX ORDER — INSULIN LISPRO 100 [IU]/ML
INJECTION, SOLUTION INTRAVENOUS; SUBCUTANEOUS
Qty: 30 ML | Refills: 0 | Status: SHIPPED | OUTPATIENT
Start: 2019-10-27

## 2022-05-19 NOTE — TELEPHONE ENCOUNTER
Let him know I gave him 1 more month of Humalog but he needs to be seen by someone before the next refill.   Talk to him about [address]

## 2024-03-30 NOTE — TELEPHONE ENCOUNTER
[facility] RN calling in requesting that we pass a note to Dr. Pancho Watson asking the  To review pt.'s chart to see if aspirin therapy would be appropriate as well as an ace inhibitor to protect patients heart and kidney  FYI only
He is on an ACE already (Garrett has an ACE in it). Aspirin is no longer recommended for everyone but we can discuss risks and benefits at the next visit. Make sure he has an appt before I leave.
You can access the FollowMyHealth Patient Portal offered by [facility] by registering at the following website: [email or web address]. By joining [employer]’s FollowMyHealth portal, you will also be able to view your health information using other applications (apps) compatible with our system.
